# Patient Record
Sex: MALE | Race: BLACK OR AFRICAN AMERICAN | NOT HISPANIC OR LATINO | Employment: OTHER | URBAN - METROPOLITAN AREA
[De-identification: names, ages, dates, MRNs, and addresses within clinical notes are randomized per-mention and may not be internally consistent; named-entity substitution may affect disease eponyms.]

---

## 2017-01-19 ENCOUNTER — HOSPITAL ENCOUNTER (EMERGENCY)
Facility: HOSPITAL | Age: 72
Discharge: HOME | End: 2017-01-20
Attending: EMERGENCY MEDICINE
Payer: MEDICARE

## 2017-01-19 DIAGNOSIS — H10.32 ACUTE BACTERIAL CONJUNCTIVITIS OF LEFT EYE: Primary | ICD-10-CM

## 2017-01-19 DIAGNOSIS — R05.9 COUGH: ICD-10-CM

## 2017-01-19 PROCEDURE — 99284 EMERGENCY DEPT VISIT MOD MDM: CPT

## 2017-01-19 NOTE — ED AVS SNAPSHOT
[facility] [address]  [place] [address]               Catherine Wilkinson   2017 11:57 PM   ED    Description:  Male : 1945   Department:  [facility]           Your Care was Coordinated By:     Provider Role From To    Melody Butler DO Attending Provider 17 0015 --      Reason for Visit     Fever     Eye Problem     Cough           Diagnoses this Visit        Comments    Acute bacterial conjunctivitis of left eye    -  Primary       ED Disposition     None           To Do List           Follow-up Information     Follow up with Eitan Dhaliwal MD In 2 days.    Specialty:  Internal Medicine    Why:  If symptoms worsen return     Contact information:    [address] [facility]  [place] [address]  [phone]         These Medications        Disp Refills Start End    gentamicin (GARAMYCIN) 0.3 % ophthalmic solution 15 mL 0 2017    Place 2 drops into both eyes 4 (four) times daily. - Both Eyes      [facility]     [facility] -  Assistance  Registered nurses in the [facility] provide clinical advisement, health education, appointment booking, and other advisory services.  Call for this free service at [phone].             Medications           Message regarding Medications     Verify the changes and/or additions to your medication regime listed below are the same as discussed with your clinician today.  If any of these changes or additions are incorrect, please notify your healthcare provider.        START taking these NEW medications        Refills    gentamicin (GARAMYCIN) 0.3 % ophthalmic solution 0    Sig: Place 2 drops into both eyes 4 (four) times daily.    Class: Print    Route: Both Eyes           Verify that the below list of medications is an accurate representation of the medications you are currently taking.  If none reported, the list may be blank. If incorrect,  please contact your healthcare provider. Carry this list with you in case of emergency.           Current Medications     AMLODIPINE BES/OLMESARTAN MED (JEANIE ORAL) Take by mouth.    gentamicin (GARAMYCIN) 0.3 % ophthalmic solution Place 2 drops into both eyes 4 (four) times daily.    ramipril (ALTACE) 10 MG capsule Take 10 mg by mouth once daily.           Clinical Reference Information           Your Vitals Were     BP Pulse Temp Resp Weight SpO2    134/61 (BP Location: Right arm, Patient Position: Sitting) 73 98.7 °F (37.1 °C) (Tympanic) 20 49.9 kg (110 lb) 98%    BMI                20.12 kg/m2          Allergies as of 1/20/2017     No Known Allergies      Immunizations Administered on Date of Encounter - 1/20/2017     None      ED Micro, Lab, POCT     Start Ordered       Status Ordering Provider    01/20/17 0022 01/20/17 0022  Influenza antigen  STAT      Final result       ED Imaging Orders     Start Ordered       Status Ordering Provider    01/20/17 0022 01/20/17 0022  X-Ray Chest PA And Lateral  1 time imaging      Acknowledged         Discharge Instructions         Conjunctivitis, Nonspecific    The membrane that covers the white part of your eye (the conjunctiva) is inflamed. Inflammation happens when your body responds to an injury, allergic reaction, infection, or illness. Symptoms of inflammation in the eye may include redness, irritation, itching, swelling, or burning. These symptoms should go away within the next 24 hours. Conjunctivitis may be related to a particle that was in your eye. If so, it may wash out with your tears or irrigation treatment. Being exposed to liquid chemicals or fumes may also cause this reaction.   Home care  · Apply a cold pack (ice in a plastic bag, wrapped in a towel) over the eye for 20 minutes at a time. This will reduce pain.  · Artificial tears may be prescribed to reduce irritation or redness.  These should be used 3 to 4 times a day.  · You may use acetaminophen or  ibuprofen to control pain, unless another medicine was prescribed.(Note: If you have chronic liver or kidney disease, or if you have ever had a stomach ulcer or gastrointestinal bleeding, talk with your healthcare provider before using these medicines.)  Follow-up care  Follow up with your healthcare provider, or as advised.  When to seek medical advice  Call your healthcare provider right away if any of these occur:  · Increased eyelid swelling  · Increased eye pain  · Increased redness or drainage from the eye  · Increased blurry vision or increased sensitivity to light  · Failure of normal vision to return within 24 to 48 hours  © 3246-1108 [employer]. [address] [place] [address]. All rights reserved. This information is not intended as a substitute for professional medical care. Always follow your healthcare professional's instructions.          [facility] Sign-Up     Activating your [facility] account is as easy as 1-2-3!     1) Visit [email or web address], select Sign Up Now, enter this activation code and your date of birth, then select Next.  [email or web address]  Expires: 3/6/2017 12:59 AM      2) Create a username and password to use when you visit [facility] in the future and select a security question in case you lose your password and select Next.    3) Enter your e-mail address and click Sign Up!    Additional Information  If you have questions, please e-mail [email or web address] or call [phone] to talk to our [facility] staff. Remember, [facility] is NOT to be used for urgent needs. For medical emergencies, dial 911.          [facility] complies with applicable Federal civil rights laws and does not discriminate on the basis of race, color, national origin, age, disability, or sex.        Language Assistance Services     ATTENTION: Language assistance services are available, free of charge. Please call [phone].      ATENCIÓN: abhijit Saleh  disposición servicios gratuitos de asistencia lingüística. Llsukhwinder al [phone].     JORGE Ý: N?u b?n nói Ti?ng Vi?t, có các d?ch v? h? tr? ngôn ng? mi?n phí dành cho b?n. G?i s? [phone].

## 2017-01-20 VITALS
WEIGHT: 110 LBS | OXYGEN SATURATION: 100 % | DIASTOLIC BLOOD PRESSURE: 76 MMHG | SYSTOLIC BLOOD PRESSURE: 139 MMHG | HEART RATE: 71 BPM | RESPIRATION RATE: 18 BRPM | BODY MASS INDEX: 20.12 KG/M2 | TEMPERATURE: 98 F

## 2017-01-20 LAB
FLUAV AG SPEC QL IA: NEGATIVE
FLUBV AG SPEC QL IA: NEGATIVE
SPECIMEN SOURCE: NORMAL

## 2017-01-20 PROCEDURE — 87400 INFLUENZA A/B EACH AG IA: CPT

## 2017-01-20 PROCEDURE — 25000003 PHARM REV CODE 250: Performed by: EMERGENCY MEDICINE

## 2017-01-20 RX ORDER — GENTAMICIN SULFATE 0.3 %
0.5 OINTMENT (GRAM) OPHTHALMIC (EYE)
Status: COMPLETED | OUTPATIENT
Start: 2017-01-20 | End: 2017-01-20

## 2017-01-20 RX ORDER — GENTAMICIN SULFATE 3 MG/ML
2 SOLUTION/ DROPS OPHTHALMIC 4 TIMES DAILY
Qty: 15 ML | Refills: 0 | Status: SHIPPED | OUTPATIENT
Start: 2017-01-20 | End: 2017-01-30

## 2017-01-20 RX ADMIN — GENTAMICIN SULFATE 0.5 INCH: 3 OINTMENT OPHTHALMIC at 01:01

## 2017-01-20 NOTE — ED PROVIDER NOTES
"Encounter Date: 1/19/2017       History     Chief Complaint   Patient presents with    Fever     cargiver states "he felt warm so I brought him in"     Eye Problem     reports eye swelling and cough for one week    Cough     reports decreased po intake      Review of patient's allergies indicates:  No Known Allergies  HPI     Patient presented to the emergency department with his sister from home.  Patient has advanced mental retardation.  He lives at home with his sister he is nonverbal.  Patient was brought to the emergency room tonight because the sister felt to his running fever.  He's also had some eye drainage over the last week.  Patient also has had some cough and slight decrease in his fluid intake however he continues to eat normally.  He's had no nausea vomiting or diarrhea.  He's had no urinary change.  Patient is very pleasant and interactive with the examiner.  He is nonverbal.  He is difficult to obtain a history from the patient.  Past Medical History   Diagnosis Date    Hypertension     MR (mental retardation)      No past medical history pertinent negatives.  Past Surgical History   Procedure Laterality Date    Amputation       History reviewed. No pertinent family history.  Social History   Substance Use Topics    Smoking status: Never Smoker    Smokeless tobacco: None    Alcohol use No     Review of Systems   Reason unable to perform ROS: history is  by his sister.   Constitutional: Negative for appetite change, chills and fever.   HENT: Positive for congestion. Negative for ear discharge, rhinorrhea and sore throat.    Eyes: Positive for pain, discharge and redness.   Respiratory: Negative for cough and shortness of breath.    Cardiovascular: Negative for chest pain.   Gastrointestinal: Negative for abdominal pain, nausea and vomiting.   Genitourinary: Negative for decreased urine volume and difficulty urinating.   Neurological: Negative for dizziness, weakness and light-headedness. "       Physical Exam   Initial Vitals   BP Pulse Resp Temp SpO2   01/19/17 2342 01/19/17 2342 01/19/17 2342 01/19/17 2342 01/19/17 2342   134/61 73 20 98.7 °F (37.1 °C) 98 %     Physical Exam    Nursing note and vitals reviewed.  Constitutional: He appears well-developed and well-nourished.   HENT:   Head: Normocephalic and atraumatic.   Right Ear: External ear normal.   Left Ear: External ear normal.   Nose: Nose normal.   Eyes: EOM are normal. Pupils are equal, round, and reactive to light. Left eye exhibits discharge.   Patient has severe conjunctival injection with purulent white drainage.  The right conjunctiva is very injected as well without drainage at this time   Neck: Normal range of motion. Neck supple.   Cardiovascular: Normal rate and regular rhythm.   Pulmonary/Chest: Breath sounds normal.   Abdominal: Soft. There is no rebound and no guarding.   Musculoskeletal: Normal range of motion.   Neurological: He is alert. No cranial nerve deficit.   And has advanced mental retardation   Skin: Skin is warm and dry.   Psychiatric: He has a normal mood and affect.         ED Course   Procedures  Labs Reviewed   INFLUENZA A AND B ANTIGEN             Medical Decision Making:   Differential Diagnosis:   Patient presented to the emergency department with history of having fever tonight eye drainage and cough.  Patient has purulent  conjunctivitis of the left eye.  His lung sounds are clear to auscultation had minimal nasal discharge.  Patient's influenza is negative.  Chest x-ray is within normal limits.  Patient is going to be sent home on Genoptic drops as well as Genoptic ointment.  N discussed with the family that this is contagious and care must be taken in helping care for the patient's infection.  They're encouraged to wear gloves at all times.  Was encouraged to get a thermometer so they can monitor the patient's temperature.  And use Tylenol for fever if appropriate.  Encouraged to follow up with his family  doctor in 3-4 days for recheck.                   ED Course     Clinical Impression:   The primary encounter diagnosis was Acute bacterial conjunctivitis of left eye. A diagnosis of Cough was also pertinent to this visit.          Melody Butler DO  01/20/17 0149

## 2017-01-20 NOTE — ED NOTES
Family member states patient has been having a runny nose and cough.  Also left eye red and swollen.

## 2017-01-20 NOTE — DISCHARGE INSTRUCTIONS
Conjunctivitis, Nonspecific    The membrane that covers the white part of your eye (the conjunctiva) is inflamed. Inflammation happens when your body responds to an injury, allergic reaction, infection, or illness. Symptoms of inflammation in the eye may include redness, irritation, itching, swelling, or burning. These symptoms should go away within the next 24 hours. Conjunctivitis may be related to a particle that was in your eye. If so, it may wash out with your tears or irrigation treatment. Being exposed to liquid chemicals or fumes may also cause this reaction.   Home care  · Apply a cold pack (ice in a plastic bag, wrapped in a towel) over the eye for 20 minutes at a time. This will reduce pain.  · Artificial tears may be prescribed to reduce irritation or redness.  These should be used 3 to 4 times a day.  · You may use acetaminophen or ibuprofen to control pain, unless another medicine was prescribed.(Note: If you have chronic liver or kidney disease, or if you have ever had a stomach ulcer or gastrointestinal bleeding, talk with your healthcare provider before using these medicines.)  Follow-up care  Follow up with your healthcare provider, or as advised.  When to seek medical advice  Call your healthcare provider right away if any of these occur:  · Increased eyelid swelling  · Increased eye pain  · Increased redness or drainage from the eye  · Increased blurry vision or increased sensitivity to light  · Failure of normal vision to return within 24 to 48 hours  © 1137-6609 [employer]. [address] [place] [address]. All rights reserved. This information is not intended as a substitute for professional medical care. Always follow your healthcare professional's instructions.

## 2020-12-09 NOTE — ED NOTES
Awake and alert.  Respirations even and unlabored. Family members with patient.   Prescribed appropriate antibiotic therapy.

## 2021-01-14 ENCOUNTER — OFFICE VISIT (OUTPATIENT)
Dept: URGENT CARE | Facility: CLINIC | Age: 76
End: 2021-01-14
Payer: MEDICARE

## 2021-01-14 VITALS
HEIGHT: 62 IN | TEMPERATURE: 97 F | OXYGEN SATURATION: 100 % | BODY MASS INDEX: 18.4 KG/M2 | WEIGHT: 100 LBS | DIASTOLIC BLOOD PRESSURE: 71 MMHG | HEART RATE: 61 BPM | RESPIRATION RATE: 16 BRPM | SYSTOLIC BLOOD PRESSURE: 139 MMHG

## 2021-01-14 DIAGNOSIS — H66.92 LEFT OTITIS MEDIA, UNSPECIFIED OTITIS MEDIA TYPE: Primary | ICD-10-CM

## 2021-01-14 PROCEDURE — 99214 OFFICE O/P EST MOD 30 MIN: CPT | Mod: S$GLB,,, | Performed by: FAMILY MEDICINE

## 2021-01-14 PROCEDURE — 99214 PR OFFICE/OUTPT VISIT, EST, LEVL IV, 30-39 MIN: ICD-10-PCS | Mod: S$GLB,,, | Performed by: FAMILY MEDICINE

## 2021-01-14 RX ORDER — AMOXICILLIN AND CLAVULANATE POTASSIUM 875; 125 MG/1; MG/1
1 TABLET, FILM COATED ORAL 2 TIMES DAILY
Qty: 20 TABLET | Refills: 0 | Status: SHIPPED | OUTPATIENT
Start: 2021-01-14 | End: 2021-01-24

## 2021-01-14 RX ORDER — LOSARTAN POTASSIUM 25 MG/1
25 TABLET ORAL DAILY
Status: ON HOLD | COMMUNITY
End: 2023-11-03 | Stop reason: HOSPADM

## 2021-01-29 ENCOUNTER — HOSPITAL ENCOUNTER (EMERGENCY)
Facility: HOSPITAL | Age: 76
Discharge: HOME | End: 2021-01-29
Attending: EMERGENCY MEDICINE
Payer: MEDICARE

## 2021-01-29 VITALS
TEMPERATURE: 98 F | RESPIRATION RATE: 21 BRPM | HEART RATE: 81 BPM | SYSTOLIC BLOOD PRESSURE: 154 MMHG | OXYGEN SATURATION: 97 % | DIASTOLIC BLOOD PRESSURE: 73 MMHG

## 2021-01-29 DIAGNOSIS — M79.89 LEG SWELLING: ICD-10-CM

## 2021-01-29 DIAGNOSIS — M25.475 SWELLING OF FOOT JOINT, LEFT: ICD-10-CM

## 2021-01-29 DIAGNOSIS — B35.6 TINEA CRURIS: ICD-10-CM

## 2021-01-29 DIAGNOSIS — J90 RECURRENT PLEURAL EFFUSION ON RIGHT: Primary | ICD-10-CM

## 2021-01-29 LAB
ALBUMIN SERPL BCP-MCNC: 3.1 G/DL (ref 3.5–5.2)
ALP SERPL-CCNC: 138 U/L (ref 55–135)
ALT SERPL W/O P-5'-P-CCNC: 25 U/L (ref 10–44)
ANION GAP SERPL CALC-SCNC: 8 MMOL/L (ref 8–16)
AST SERPL-CCNC: 34 U/L (ref 10–40)
BILIRUB SERPL-MCNC: 0.4 MG/DL (ref 0.1–1)
BILIRUB UR QL STRIP: NEGATIVE
BNP SERPL-MCNC: 98 PG/ML (ref 0–99)
BUN SERPL-MCNC: 13 MG/DL (ref 8–23)
CALCIUM SERPL-MCNC: 8.7 MG/DL (ref 8.7–10.5)
CHLORIDE SERPL-SCNC: 106 MMOL/L (ref 95–110)
CLARITY UR: CLEAR
CO2 SERPL-SCNC: 26 MMOL/L (ref 23–29)
COLOR UR: YELLOW
CREAT SERPL-MCNC: 0.8 MG/DL (ref 0.5–1.4)
ERYTHROCYTE [DISTWIDTH] IN BLOOD BY AUTOMATED COUNT: 13.8 % (ref 11.5–14.5)
EST. GFR  (AFRICAN AMERICAN): >60 ML/MIN/1.73 M^2
EST. GFR  (NON AFRICAN AMERICAN): >60 ML/MIN/1.73 M^2
GLUCOSE SERPL-MCNC: 75 MG/DL (ref 70–110)
GLUCOSE UR QL STRIP: NEGATIVE
HCT VFR BLD AUTO: 37.5 % (ref 40–54)
HGB BLD-MCNC: 11.7 G/DL (ref 14–18)
HGB UR QL STRIP: NEGATIVE
KETONES UR QL STRIP: NEGATIVE
LEUKOCYTE ESTERASE UR QL STRIP: ABNORMAL
MCH RBC QN AUTO: 29.8 PG (ref 27–31)
MCHC RBC AUTO-ENTMCNC: 31.2 G/DL (ref 32–36)
MCV RBC AUTO: 96 FL (ref 82–98)
MICROSCOPIC COMMENT: ABNORMAL
NITRITE UR QL STRIP: NEGATIVE
PH UR STRIP: 8 [PH] (ref 5–8)
PLATELET # BLD AUTO: 143 K/UL (ref 150–350)
PMV BLD AUTO: 10.3 FL (ref 9.2–12.9)
POTASSIUM SERPL-SCNC: 4.9 MMOL/L (ref 3.5–5.1)
PROT SERPL-MCNC: 7.3 G/DL (ref 6–8.4)
PROT UR QL STRIP: NEGATIVE
RBC # BLD AUTO: 3.92 M/UL (ref 4.6–6.2)
SARS-COV-2 RDRP RESP QL NAA+PROBE: NEGATIVE
SODIUM SERPL-SCNC: 140 MMOL/L (ref 136–145)
SP GR UR STRIP: 1.01 (ref 1–1.03)
TROPONIN I SERPL DL<=0.01 NG/ML-MCNC: <0.006 NG/ML (ref 0–0.03)
URN SPEC COLLECT METH UR: ABNORMAL
UROBILINOGEN UR STRIP-ACNC: NEGATIVE EU/DL
WBC # BLD AUTO: 4.63 K/UL (ref 3.9–12.7)
WBC #/AREA URNS HPF: 17 /HPF (ref 0–5)

## 2021-01-29 PROCEDURE — 80053 COMPREHEN METABOLIC PANEL: CPT

## 2021-01-29 PROCEDURE — 93010 ELECTROCARDIOGRAM REPORT: CPT | Mod: ,,, | Performed by: INTERNAL MEDICINE

## 2021-01-29 PROCEDURE — 87086 URINE CULTURE/COLONY COUNT: CPT

## 2021-01-29 PROCEDURE — 83880 ASSAY OF NATRIURETIC PEPTIDE: CPT

## 2021-01-29 PROCEDURE — 84484 ASSAY OF TROPONIN QUANT: CPT

## 2021-01-29 PROCEDURE — U0002 COVID-19 LAB TEST NON-CDC: HCPCS

## 2021-01-29 PROCEDURE — 81000 URINALYSIS NONAUTO W/SCOPE: CPT

## 2021-01-29 PROCEDURE — 93010 EKG 12-LEAD: ICD-10-PCS | Mod: ,,, | Performed by: INTERNAL MEDICINE

## 2021-01-29 PROCEDURE — 93005 ELECTROCARDIOGRAM TRACING: CPT

## 2021-01-29 PROCEDURE — 85027 COMPLETE CBC AUTOMATED: CPT

## 2021-01-29 PROCEDURE — 99285 EMERGENCY DEPT VISIT HI MDM: CPT | Mod: 25

## 2021-01-29 RX ORDER — CEPHALEXIN 500 MG/1
500 CAPSULE ORAL EVERY 12 HOURS
Qty: 14 CAPSULE | Refills: 0 | Status: SHIPPED | OUTPATIENT
Start: 2021-01-29 | End: 2021-02-05

## 2021-01-29 RX ORDER — CLOTRIMAZOLE 1 G/ML
SOLUTION TOPICAL 2 TIMES DAILY
Qty: 15 ML | Refills: 0 | Status: SHIPPED | OUTPATIENT
Start: 2021-01-29 | End: 2021-02-05

## 2021-01-29 RX ORDER — OLMESARTAN MEDOXOMIL 40 MG/1
40 TABLET ORAL DAILY
Status: ON HOLD | COMMUNITY
End: 2023-11-03 | Stop reason: SDUPTHER

## 2021-01-29 RX ORDER — AMLODIPINE BESYLATE 5 MG/1
5 TABLET ORAL DAILY
Status: ON HOLD | COMMUNITY
End: 2023-11-03 | Stop reason: HOSPADM

## 2021-01-31 LAB — BACTERIA UR CULT: NO GROWTH

## 2021-03-12 ENCOUNTER — IMMUNIZATION (OUTPATIENT)
Dept: PRIMARY CARE CLINIC | Facility: CLINIC | Age: 76
End: 2021-03-12
Payer: MEDICAID

## 2021-03-12 DIAGNOSIS — Z23 NEED FOR VACCINATION: Primary | ICD-10-CM

## 2021-03-12 PROCEDURE — 0001A PR IMMUNIZ ADMIN, SARS-COV-2 COVID-19 VACC, 30MCG/0.3ML, 1ST DOSE: ICD-10-PCS | Mod: CV19,S$GLB,, | Performed by: INTERNAL MEDICINE

## 2021-03-12 PROCEDURE — 91300 PR SARS-COV- 2 COVID-19 VACCINE, NO PRSV, 30MCG/0.3ML, IM: ICD-10-PCS | Mod: S$GLB,,, | Performed by: INTERNAL MEDICINE

## 2021-03-12 PROCEDURE — 91300 PR SARS-COV- 2 COVID-19 VACCINE, NO PRSV, 30MCG/0.3ML, IM: CPT | Mod: S$GLB,,, | Performed by: INTERNAL MEDICINE

## 2021-03-12 PROCEDURE — 0001A PR IMMUNIZ ADMIN, SARS-COV-2 COVID-19 VACC, 30MCG/0.3ML, 1ST DOSE: CPT | Mod: CV19,S$GLB,, | Performed by: INTERNAL MEDICINE

## 2021-03-12 RX ADMIN — Medication 0.3 ML: at 01:03

## 2021-04-02 ENCOUNTER — IMMUNIZATION (OUTPATIENT)
Dept: PRIMARY CARE CLINIC | Facility: CLINIC | Age: 76
End: 2021-04-02
Payer: MEDICARE

## 2021-04-02 DIAGNOSIS — Z23 NEED FOR VACCINATION: Primary | ICD-10-CM

## 2021-04-02 PROCEDURE — 0002A PR IMMUNIZ ADMIN, SARS-COV-2 COVID-19 VACC, 30MCG/0.3ML, 2ND DOSE: ICD-10-PCS | Mod: CV19,S$GLB,, | Performed by: INTERNAL MEDICINE

## 2021-04-02 PROCEDURE — 91300 PR SARS-COV- 2 COVID-19 VACCINE, NO PRSV, 30MCG/0.3ML, IM: ICD-10-PCS | Mod: S$GLB,,, | Performed by: INTERNAL MEDICINE

## 2021-04-02 PROCEDURE — 91300 PR SARS-COV- 2 COVID-19 VACCINE, NO PRSV, 30MCG/0.3ML, IM: CPT | Mod: S$GLB,,, | Performed by: INTERNAL MEDICINE

## 2021-04-02 PROCEDURE — 0002A PR IMMUNIZ ADMIN, SARS-COV-2 COVID-19 VACC, 30MCG/0.3ML, 2ND DOSE: CPT | Mod: CV19,S$GLB,, | Performed by: INTERNAL MEDICINE

## 2021-04-02 RX ADMIN — Medication 0.3 ML: at 02:04

## 2022-03-07 ENCOUNTER — PES CALL (OUTPATIENT)
Dept: ADMINISTRATIVE | Facility: CLINIC | Age: 77
End: 2022-03-07
Payer: MEDICARE

## 2022-09-02 ENCOUNTER — PES CALL (OUTPATIENT)
Dept: ADMINISTRATIVE | Facility: OTHER | Age: 77
End: 2022-09-02
Payer: MEDICARE

## 2023-11-01 ENCOUNTER — CLINICAL SUPPORT (OUTPATIENT)
Dept: URGENT CARE | Facility: CLINIC | Age: 78
End: 2023-11-01
Payer: MEDICARE

## 2023-11-01 ENCOUNTER — HOSPITAL ENCOUNTER (OUTPATIENT)
Facility: HOSPITAL | Age: 78
Discharge: HOME | End: 2023-11-03
Attending: EMERGENCY MEDICINE | Admitting: INTERNAL MEDICINE
Payer: MEDICARE

## 2023-11-01 DIAGNOSIS — N39.0 COMPLICATED UTI (URINARY TRACT INFECTION): ICD-10-CM

## 2023-11-01 DIAGNOSIS — Z23 ENCOUNTER FOR ADMINISTRATION OF VACCINE: Primary | ICD-10-CM

## 2023-11-01 DIAGNOSIS — N39.0 UTI (URINARY TRACT INFECTION): Primary | ICD-10-CM

## 2023-11-01 DIAGNOSIS — N13.30 HYDRONEPHROSIS, UNSPECIFIED HYDRONEPHROSIS TYPE: ICD-10-CM

## 2023-11-01 DIAGNOSIS — N17.9 AKI (ACUTE KIDNEY INJURY): ICD-10-CM

## 2023-11-01 LAB
ALBUMIN SERPL BCP-MCNC: 3.6 G/DL (ref 3.5–5.2)
ALP SERPL-CCNC: 101 U/L (ref 55–135)
ALT SERPL W/O P-5'-P-CCNC: 15 U/L (ref 10–44)
ANION GAP SERPL CALC-SCNC: 9 MMOL/L (ref 8–16)
AST SERPL-CCNC: 24 U/L (ref 10–40)
BACTERIA #/AREA URNS HPF: ABNORMAL /HPF
BASOPHILS # BLD AUTO: 0.04 K/UL (ref 0–0.2)
BASOPHILS NFR BLD: 0.5 % (ref 0–1.9)
BILIRUB SERPL-MCNC: 0.7 MG/DL (ref 0.1–1)
BILIRUB UR QL STRIP: NEGATIVE
BUN SERPL-MCNC: 26 MG/DL (ref 8–23)
CALCIUM SERPL-MCNC: 10 MG/DL (ref 8.7–10.5)
CHLORIDE SERPL-SCNC: 118 MMOL/L (ref 95–110)
CLARITY UR: ABNORMAL
CO2 SERPL-SCNC: 20 MMOL/L (ref 23–29)
COLOR UR: YELLOW
CREAT SERPL-MCNC: 1.7 MG/DL (ref 0.5–1.4)
DIFFERENTIAL METHOD: NORMAL
EOSINOPHIL # BLD AUTO: 0.3 K/UL (ref 0–0.5)
EOSINOPHIL NFR BLD: 3.9 % (ref 0–8)
ERYTHROCYTE [DISTWIDTH] IN BLOOD BY AUTOMATED COUNT: 12.7 % (ref 11.5–14.5)
EST. GFR  (NO RACE VARIABLE): 41 ML/MIN/1.73 M^2
GLUCOSE SERPL-MCNC: 104 MG/DL (ref 70–110)
GLUCOSE UR QL STRIP: NEGATIVE
HCT VFR BLD AUTO: 46.4 % (ref 40–54)
HGB BLD-MCNC: 15 G/DL (ref 14–18)
HGB UR QL STRIP: ABNORMAL
HYALINE CASTS #/AREA URNS LPF: 0 /LPF
IMM GRANULOCYTES # BLD AUTO: 0.02 K/UL (ref 0–0.04)
IMM GRANULOCYTES NFR BLD AUTO: 0.3 % (ref 0–0.5)
KETONES UR QL STRIP: NEGATIVE
LEUKOCYTE ESTERASE UR QL STRIP: ABNORMAL
LYMPHOCYTES # BLD AUTO: 1.5 K/UL (ref 1–4.8)
LYMPHOCYTES NFR BLD: 20.8 % (ref 18–48)
MCH RBC QN AUTO: 30.4 PG (ref 27–31)
MCHC RBC AUTO-ENTMCNC: 32.3 G/DL (ref 32–36)
MCV RBC AUTO: 94 FL (ref 82–98)
MICROSCOPIC COMMENT: ABNORMAL
MONOCYTES # BLD AUTO: 0.7 K/UL (ref 0.3–1)
MONOCYTES NFR BLD: 9 % (ref 4–15)
NEUTROPHILS # BLD AUTO: 4.9 K/UL (ref 1.8–7.7)
NEUTROPHILS NFR BLD: 65.8 % (ref 38–73)
NITRITE UR QL STRIP: NEGATIVE
NRBC BLD-RTO: 0 /100 WBC
PH UR STRIP: >8 [PH] (ref 5–8)
PLATELET # BLD AUTO: 195 K/UL (ref 150–450)
PMV BLD AUTO: 9.7 FL (ref 9.2–12.9)
POTASSIUM SERPL-SCNC: 4.7 MMOL/L (ref 3.5–5.1)
PROT SERPL-MCNC: 7.5 G/DL (ref 6–8.4)
PROT UR QL STRIP: ABNORMAL
RBC # BLD AUTO: 4.93 M/UL (ref 4.6–6.2)
RBC #/AREA URNS HPF: 8 /HPF (ref 0–4)
SODIUM SERPL-SCNC: 147 MMOL/L (ref 136–145)
SP GR UR STRIP: 1.02 (ref 1–1.03)
URN SPEC COLLECT METH UR: ABNORMAL
UROBILINOGEN UR STRIP-ACNC: NEGATIVE EU/DL
WBC # BLD AUTO: 7.42 K/UL (ref 3.9–12.7)
WBC #/AREA URNS HPF: 6 /HPF (ref 0–5)

## 2023-11-01 PROCEDURE — 96365 THER/PROPH/DIAG IV INF INIT: CPT

## 2023-11-01 PROCEDURE — 63600175 PHARM REV CODE 636 W HCPCS: Performed by: EMERGENCY MEDICINE

## 2023-11-01 PROCEDURE — 87086 URINE CULTURE/COLONY COUNT: CPT

## 2023-11-01 PROCEDURE — 96361 HYDRATE IV INFUSION ADD-ON: CPT

## 2023-11-01 PROCEDURE — G0378 HOSPITAL OBSERVATION PER HR: HCPCS

## 2023-11-01 PROCEDURE — 93005 ELECTROCARDIOGRAM TRACING: CPT

## 2023-11-01 PROCEDURE — 82570 ASSAY OF URINE CREATININE: CPT

## 2023-11-01 PROCEDURE — P9612 CATHETERIZE FOR URINE SPEC: HCPCS

## 2023-11-01 PROCEDURE — 84300 ASSAY OF URINE SODIUM: CPT

## 2023-11-01 PROCEDURE — 80053 COMPREHEN METABOLIC PANEL: CPT

## 2023-11-01 PROCEDURE — S5010 5% DEXTROSE AND 0.45% SALINE: HCPCS

## 2023-11-01 PROCEDURE — 25000003 PHARM REV CODE 250

## 2023-11-01 PROCEDURE — 99285 EMERGENCY DEPT VISIT HI MDM: CPT | Mod: 25

## 2023-11-01 PROCEDURE — 94760 N-INVAS EAR/PLS OXIMETRY 1: CPT

## 2023-11-01 PROCEDURE — 96372 THER/PROPH/DIAG INJ SC/IM: CPT | Mod: 59 | Performed by: EMERGENCY MEDICINE

## 2023-11-01 PROCEDURE — 90694 VACC AIIV4 NO PRSRV 0.5ML IM: CPT | Mod: S$GLB,,, | Performed by: FAMILY MEDICINE

## 2023-11-01 PROCEDURE — 93010 EKG 12-LEAD: ICD-10-PCS | Mod: ,,, | Performed by: INTERNAL MEDICINE

## 2023-11-01 PROCEDURE — 93010 ELECTROCARDIOGRAM REPORT: CPT | Mod: ,,, | Performed by: INTERNAL MEDICINE

## 2023-11-01 PROCEDURE — 81000 URINALYSIS NONAUTO W/SCOPE: CPT

## 2023-11-01 PROCEDURE — G0008 ADMIN INFLUENZA VIRUS VAC: HCPCS | Mod: S$GLB,,, | Performed by: FAMILY MEDICINE

## 2023-11-01 PROCEDURE — 25000003 PHARM REV CODE 250: Performed by: EMERGENCY MEDICINE

## 2023-11-01 PROCEDURE — G0008 FLU VACCINE - QUADRIVALENT - ADJUVANTED: ICD-10-PCS | Mod: S$GLB,,, | Performed by: FAMILY MEDICINE

## 2023-11-01 PROCEDURE — 85025 COMPLETE CBC W/AUTO DIFF WBC: CPT

## 2023-11-01 PROCEDURE — 90694 FLU VACCINE - QUADRIVALENT - ADJUVANTED: ICD-10-PCS | Mod: S$GLB,,, | Performed by: FAMILY MEDICINE

## 2023-11-01 RX ORDER — SODIUM CHLORIDE AND POTASSIUM CHLORIDE 150; 900 MG/100ML; MG/100ML
INJECTION, SOLUTION INTRAVENOUS CONTINUOUS
Status: DISCONTINUED | OUTPATIENT
Start: 2023-11-01 | End: 2023-11-01

## 2023-11-01 RX ORDER — DEXTROSE MONOHYDRATE AND SODIUM CHLORIDE 5; .45 G/100ML; G/100ML
INJECTION, SOLUTION INTRAVENOUS CONTINUOUS
Status: DISCONTINUED | OUTPATIENT
Start: 2023-11-01 | End: 2023-11-02

## 2023-11-01 RX ORDER — SODIUM CHLORIDE 0.9 % (FLUSH) 0.9 %
10 SYRINGE (ML) INJECTION
Status: DISCONTINUED | OUTPATIENT
Start: 2023-11-01 | End: 2023-11-03 | Stop reason: HOSPADM

## 2023-11-01 RX ORDER — ONDANSETRON 2 MG/ML
4 INJECTION INTRAMUSCULAR; INTRAVENOUS EVERY 8 HOURS PRN
Status: DISCONTINUED | OUTPATIENT
Start: 2023-11-01 | End: 2023-11-03 | Stop reason: HOSPADM

## 2023-11-01 RX ORDER — DOXYLAMINE SUCCINATE 25 MG
TABLET ORAL ONCE
Status: DISCONTINUED | OUTPATIENT
Start: 2023-11-01 | End: 2023-11-01

## 2023-11-01 RX ORDER — DOXYLAMINE SUCCINATE 25 MG
TABLET ORAL 2 TIMES DAILY
Status: DISCONTINUED | OUTPATIENT
Start: 2023-11-02 | End: 2023-11-01

## 2023-11-01 RX ORDER — DOXYLAMINE SUCCINATE 25 MG
TABLET ORAL 2 TIMES DAILY
Status: DISCONTINUED | OUTPATIENT
Start: 2023-11-02 | End: 2023-11-03

## 2023-11-01 RX ORDER — RAMIPRIL 10 MG/1
10 CAPSULE ORAL DAILY
Status: ON HOLD | COMMUNITY
End: 2023-11-03 | Stop reason: SDUPTHER

## 2023-11-01 RX ORDER — DEXTROSE MONOHYDRATE AND SODIUM CHLORIDE 5; .45 G/100ML; G/100ML
INJECTION, SOLUTION INTRAVENOUS CONTINUOUS
Status: DISCONTINUED | OUTPATIENT
Start: 2023-11-01 | End: 2023-11-01

## 2023-11-01 RX ORDER — TALC
6 POWDER (GRAM) TOPICAL NIGHTLY PRN
Status: DISCONTINUED | OUTPATIENT
Start: 2023-11-01 | End: 2023-11-03 | Stop reason: HOSPADM

## 2023-11-01 RX ORDER — ENOXAPARIN SODIUM 100 MG/ML
30 INJECTION SUBCUTANEOUS EVERY 24 HOURS
Status: DISCONTINUED | OUTPATIENT
Start: 2023-11-01 | End: 2023-11-02

## 2023-11-01 RX ADMIN — DEXTROSE AND SODIUM CHLORIDE: 5; 450 INJECTION, SOLUTION INTRAVENOUS at 10:11

## 2023-11-01 RX ADMIN — CEFTRIAXONE SODIUM 1 G: 1 INJECTION, POWDER, FOR SOLUTION INTRAMUSCULAR; INTRAVENOUS at 07:11

## 2023-11-01 RX ADMIN — SODIUM CHLORIDE 500 ML: 9 INJECTION, SOLUTION INTRAVENOUS at 07:11

## 2023-11-01 RX ADMIN — SODIUM CHLORIDE AND POTASSIUM CHLORIDE: .9; .15 SOLUTION INTRAVENOUS at 09:11

## 2023-11-01 RX ADMIN — ENOXAPARIN SODIUM 30 MG: 30 INJECTION SUBCUTANEOUS at 09:11

## 2023-11-01 NOTE — Clinical Note
Diagnosis: UTI (urinary tract infection) [483313]   Future Attending Provider: MELISSA TOVAR [[id number]]   Admitting Provider:: MELISSA TOVAR [[id number]]
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
## 2023-11-01 NOTE — ED NOTES
78 y.o. male brought in by family member for concern of UTI - state they noticed dark, foul smelling, and bloody urine that started yesterday. Upon inspection patient has rash and excoriation to genitals with copious purulent drainage and foul smelling odor. Patient is non-verbal - makes grunts and inaudible noises. Family denies vomiting, denies fever, denies all other medical complaints. Patient awake, alert, and oriented x 4. No apparent distress noted. VS currently stable. Patient assisted onto stretcher and changed into a gown. Patient placed on cardiac monitor, continuous pulse oximetry and automatic blood pressure cuff. Bed placed in low locked position, side rails up x 2, call light is within reach of patient orientation to room and explanation of wait provided to patient, alarms set and turned on for monitor and pulse ox, awaiting MD evaluation and orders, plan of care in progress.

## 2023-11-02 PROBLEM — F79 INTELLECTUAL DISABILITY: Chronic | Status: ACTIVE | Noted: 2023-11-02

## 2023-11-02 PROBLEM — N39.0 COMPLICATED UTI (URINARY TRACT INFECTION): Status: ACTIVE | Noted: 2023-11-02

## 2023-11-02 PROBLEM — N17.9 AKI (ACUTE KIDNEY INJURY): Status: ACTIVE | Noted: 2023-11-02

## 2023-11-02 PROBLEM — B37.49 CANDIDAL BALANOPOSTHITIS: Status: ACTIVE | Noted: 2023-11-02

## 2023-11-02 LAB
ALBUMIN SERPL BCP-MCNC: 3 G/DL (ref 3.5–5.2)
ALP SERPL-CCNC: 85 U/L (ref 55–135)
ALT SERPL W/O P-5'-P-CCNC: 12 U/L (ref 10–44)
ANION GAP SERPL CALC-SCNC: 9 MMOL/L (ref 8–16)
AST SERPL-CCNC: 21 U/L (ref 10–40)
BASOPHILS # BLD AUTO: 0.04 K/UL (ref 0–0.2)
BASOPHILS NFR BLD: 0.7 % (ref 0–1.9)
BILIRUB SERPL-MCNC: 0.5 MG/DL (ref 0.1–1)
BUN SERPL-MCNC: 22 MG/DL (ref 8–23)
CALCIUM SERPL-MCNC: 8.8 MG/DL (ref 8.7–10.5)
CHLORIDE SERPL-SCNC: 118 MMOL/L (ref 95–110)
CO2 SERPL-SCNC: 19 MMOL/L (ref 23–29)
CREAT SERPL-MCNC: 1.3 MG/DL (ref 0.5–1.4)
CREAT UR-MCNC: 105.5 MG/DL (ref 23–375)
DIFFERENTIAL METHOD: ABNORMAL
EOSINOPHIL # BLD AUTO: 0.3 K/UL (ref 0–0.5)
EOSINOPHIL NFR BLD: 5.3 % (ref 0–8)
ERYTHROCYTE [DISTWIDTH] IN BLOOD BY AUTOMATED COUNT: 12.6 % (ref 11.5–14.5)
EST. GFR  (NO RACE VARIABLE): 57 ML/MIN/1.73 M^2
GLUCOSE SERPL-MCNC: 102 MG/DL (ref 70–110)
HCT VFR BLD AUTO: 39.6 % (ref 40–54)
HGB BLD-MCNC: 12.9 G/DL (ref 14–18)
IMM GRANULOCYTES # BLD AUTO: 0.02 K/UL (ref 0–0.04)
IMM GRANULOCYTES NFR BLD AUTO: 0.4 % (ref 0–0.5)
LYMPHOCYTES # BLD AUTO: 1.4 K/UL (ref 1–4.8)
LYMPHOCYTES NFR BLD: 26.1 % (ref 18–48)
MCH RBC QN AUTO: 30.3 PG (ref 27–31)
MCHC RBC AUTO-ENTMCNC: 32.6 G/DL (ref 32–36)
MCV RBC AUTO: 93 FL (ref 82–98)
MONOCYTES # BLD AUTO: 0.6 K/UL (ref 0.3–1)
MONOCYTES NFR BLD: 11 % (ref 4–15)
NEUTROPHILS # BLD AUTO: 3.1 K/UL (ref 1.8–7.7)
NEUTROPHILS NFR BLD: 56.9 % (ref 38–73)
NRBC BLD-RTO: 0 /100 WBC
PLATELET # BLD AUTO: 138 K/UL (ref 150–450)
PMV BLD AUTO: 9.7 FL (ref 9.2–12.9)
POTASSIUM SERPL-SCNC: 3.7 MMOL/L (ref 3.5–5.1)
PROT SERPL-MCNC: 6.2 G/DL (ref 6–8.4)
PROT UR-MCNC: 803 MG/DL (ref 0–15)
PROT/CREAT UR: 7.61 MG/G{CREAT} (ref 0–0.2)
RBC # BLD AUTO: 4.26 M/UL (ref 4.6–6.2)
SODIUM SERPL-SCNC: 146 MMOL/L (ref 136–145)
SODIUM UR-SCNC: 113 MMOL/L (ref 20–250)
WBC # BLD AUTO: 5.47 K/UL (ref 3.9–12.7)

## 2023-11-02 PROCEDURE — 63600175 PHARM REV CODE 636 W HCPCS: Performed by: STUDENT IN AN ORGANIZED HEALTH CARE EDUCATION/TRAINING PROGRAM

## 2023-11-02 PROCEDURE — 99204 OFFICE O/P NEW MOD 45 MIN: CPT | Mod: GC,,, | Performed by: UROLOGY

## 2023-11-02 PROCEDURE — 36415 COLL VENOUS BLD VENIPUNCTURE: CPT

## 2023-11-02 PROCEDURE — 25000003 PHARM REV CODE 250

## 2023-11-02 PROCEDURE — 99900035 HC TECH TIME PER 15 MIN (STAT)

## 2023-11-02 PROCEDURE — 99204 PR OFFICE/OUTPT VISIT, NEW, LEVL IV, 45-59 MIN: ICD-10-PCS | Mod: GC,,, | Performed by: UROLOGY

## 2023-11-02 PROCEDURE — G0378 HOSPITAL OBSERVATION PER HR: HCPCS

## 2023-11-02 PROCEDURE — 80053 COMPREHEN METABOLIC PANEL: CPT

## 2023-11-02 PROCEDURE — 85025 COMPLETE CBC W/AUTO DIFF WBC: CPT

## 2023-11-02 PROCEDURE — 96367 TX/PROPH/DG ADDL SEQ IV INF: CPT

## 2023-11-02 PROCEDURE — 25000003 PHARM REV CODE 250: Performed by: INTERNAL MEDICINE

## 2023-11-02 PROCEDURE — 63600175 PHARM REV CODE 636 W HCPCS: Performed by: INTERNAL MEDICINE

## 2023-11-02 PROCEDURE — 96372 THER/PROPH/DIAG INJ SC/IM: CPT | Performed by: INTERNAL MEDICINE

## 2023-11-02 PROCEDURE — 94760 N-INVAS EAR/PLS OXIMETRY 1: CPT

## 2023-11-02 PROCEDURE — 96361 HYDRATE IV INFUSION ADD-ON: CPT

## 2023-11-02 RX ORDER — ERYTHROMYCIN 5 MG/G
OINTMENT OPHTHALMIC EVERY 6 HOURS
Status: DISCONTINUED | OUTPATIENT
Start: 2023-11-02 | End: 2023-11-03 | Stop reason: HOSPADM

## 2023-11-02 RX ORDER — ERYTHROMYCIN 5 MG/G
OINTMENT OPHTHALMIC EVERY 6 HOURS
Status: DISCONTINUED | OUTPATIENT
Start: 2023-11-02 | End: 2023-11-02

## 2023-11-02 RX ORDER — SODIUM CHLORIDE, SODIUM LACTATE, POTASSIUM CHLORIDE, CALCIUM CHLORIDE 600; 310; 30; 20 MG/100ML; MG/100ML; MG/100ML; MG/100ML
INJECTION, SOLUTION INTRAVENOUS CONTINUOUS
Status: DISCONTINUED | OUTPATIENT
Start: 2023-11-02 | End: 2023-11-02

## 2023-11-02 RX ORDER — SODIUM CHLORIDE, SODIUM LACTATE, POTASSIUM CHLORIDE, CALCIUM CHLORIDE 600; 310; 30; 20 MG/100ML; MG/100ML; MG/100ML; MG/100ML
INJECTION, SOLUTION INTRAVENOUS CONTINUOUS
Status: ACTIVE | OUTPATIENT
Start: 2023-11-02 | End: 2023-11-03

## 2023-11-02 RX ORDER — ENOXAPARIN SODIUM 100 MG/ML
40 INJECTION SUBCUTANEOUS EVERY 24 HOURS
Status: DISCONTINUED | OUTPATIENT
Start: 2023-11-02 | End: 2023-11-03 | Stop reason: HOSPADM

## 2023-11-02 RX ADMIN — ERYTHROMYCIN: 5 OINTMENT OPHTHALMIC at 12:11

## 2023-11-02 RX ADMIN — MICONAZOLE NITRATE: 20 CREAM TOPICAL at 08:11

## 2023-11-02 RX ADMIN — SODIUM CHLORIDE, POTASSIUM CHLORIDE, SODIUM LACTATE AND CALCIUM CHLORIDE: 600; 310; 30; 20 INJECTION, SOLUTION INTRAVENOUS at 11:11

## 2023-11-02 RX ADMIN — CEFEPIME 2 G: 2 INJECTION, POWDER, FOR SOLUTION INTRAVENOUS at 02:11

## 2023-11-02 RX ADMIN — ERYTHROMYCIN: 5 OINTMENT OPHTHALMIC at 06:11

## 2023-11-02 RX ADMIN — MICONAZOLE NITRATE: 20 CREAM TOPICAL at 09:11

## 2023-11-02 RX ADMIN — ERYTHROMYCIN: 5 OINTMENT OPHTHALMIC at 11:11

## 2023-11-02 RX ADMIN — ERYTHROMYCIN: 5 OINTMENT OPHTHALMIC at 05:11

## 2023-11-02 RX ADMIN — MICONAZOLE NITRATE: 20 CREAM TOPICAL at 12:11

## 2023-11-02 RX ADMIN — SODIUM CHLORIDE, POTASSIUM CHLORIDE, SODIUM LACTATE AND CALCIUM CHLORIDE: 600; 310; 30; 20 INJECTION, SOLUTION INTRAVENOUS at 02:11

## 2023-11-02 RX ADMIN — ENOXAPARIN SODIUM 40 MG: 40 INJECTION SUBCUTANEOUS at 04:11
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
## 2023-11-02 NOTE — PLAN OF CARE
11/01/23 2241   Admission   Initial VN Admission Questions Complete   Communication Issues? None   Shift   Virtual Nurse - Rounding Complete   Pain Management Interventions quiet environment facilitated;relaxation techniques promoted   Virtual Nurse - Patient Verbalized Approval Of VN Rounding;Camera Use   Type of Frequent Check   Type Patient Rounds   Safety/Activity   Patient Rounds bed in low position;bed wheels locked;call light in patient/parent reach;clutter free environment maintained;ID band on;visualized patient;placement of personal items at bedside   Safety Promotion/Fall Prevention assistive device/personal item within reach;bed alarm set;side rails raised x 2;nonskid shoes/socks when out of bed;medications reviewed;room near unit station;instructed to call staff for mobility;Fall Risk reviewed with patient/family;family to remain at bedside   Safety Precautions emergency equipment at bedside   Safety Bands on Patient Fall Risk Band   Activity Management Rolling - L1   Activity Assistance Provided assistance, 2 people   Positioning   Body Position weight shifting   Head of Bed (HOB) Positioning HOB elevated   Positioning/Transfer Devices pillows;in use      VN cued into room to complete admit assessment. VIP model introduced; VN working alongside bedside treatment team.  Plan of care reviewed with patient. Patient and family informed of fall risk, fall precautions, call light within reach, side rails x2 elevated. Patient is nonverbal , family notified to ask staff for assistance. Patient verbalized complete understanding. Time allowed for questions. Will continue to monitor and intervene as needed.

## 2023-11-02 NOTE — ASSESSMENT & PLAN NOTE
79 yo non-verbal M admitted with UTI, CASIE, phimosis.     - No plans for acute inpatient Urologic intervention.   - F/u urine culture.   - Trend Cr. Trending down - 1.3 from 1.7 on admission.  - Recommend 10-14 days of culture appropriate antibiotics for complicated UTI.   - Recommend antifungal cream + steroid ointment be applied to the foreskin BID for 1-2 weeks. Antifungal and steroid can be combined (ie. Nystatin + triamcinolone ointment).  - Recommend twice daily bathing of glans and foreskin with normal saline solution. Do NOT force the foreskin to retract - only retract the foreskin once able to, gently for cleaning. Always replace the foreskin in the normal anatomic position over the glans.  - Agree with ultrasound, will follow up results.   - Rest of care per primary.

## 2023-11-02 NOTE — PLAN OF CARE
Alert, nonverbal. Will occasionally grunt. Follows commands. Yellow drainage coming from right eye, eye ointment administered. IVF infusing. Medications given per MAR. Vital signs stable throughout the night. Voiding via diaper, having urinary incontinence. Skin on penis is white and patchy. Unable to pull foreskin back. Weight shifting assistance provided. Right AKA present. Safety precautions maintained. Bed alarm set. Call bell within reach. Family at bedside. Patient encouraged to call for assistance.      Problem: Adult Inpatient Plan of Care  Goal: Patient-Specific Goal (Individualized)  Outcome: Ongoing, Progressing  Goal: Absence of Hospital-Acquired Illness or Injury  Outcome: Ongoing, Progressing  Goal: Optimal Comfort and Wellbeing  Outcome: Ongoing, Progressing     Problem: Hypertension Acute  Goal: Blood Pressure Within Desired Range  Outcome: Ongoing, Progressing
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
## 2023-11-02 NOTE — PLAN OF CARE
RIGO met with pt and pt's sister Florinda [phone] at bedside to complete DCA this AM. Pt's sister reported that she will transport the pt home at time of dc. Pt has a wc and bsc at home that he will use. SW will request f/u appts. Pt and family did not have any additional questions or concerns for sw at this time. White board updated with CM name and contact information.  Discharge brochure provided.  Pt encouraged to call with any questions or concerns.  Cm will continue to follow pt through transitions of care and assist with any discharge needs.    NEIL Villareal  [phone]     11/02/23 1308   Discharge Planning   Assessment Type Discharge Planning Brief Assessment   Resource/Environmental Concerns none   Support Systems Family members   Equipment Currently Used at Home wheelchair;bedside commode   Current Living Arrangements home   Care Facility Name Home   Patient/Family Anticipates Transition to home with family   Patient/Family Anticipated Services at Transition none   DME Needed Upon Discharge  none   Discharge Plan A Home

## 2023-11-02 NOTE — PLAN OF CARE
Problem: Adult Inpatient Plan of Care  Goal: Plan of Care Review  Outcome: Ongoing, Progressing  Goal: Patient-Specific Goal (Individualized)  Outcome: Ongoing, Progressing  Goal: Absence of Hospital-Acquired Illness or Injury  Outcome: Ongoing, Progressing  Goal: Optimal Comfort and Wellbeing  Outcome: Ongoing, Progressing  Goal: Readiness for Transition of Care  Outcome: Ongoing, Progressing     Problem: UTI (Urinary Tract Infection)  Goal: Improved Infection Symptoms  Outcome: Ongoing, Progressing     Problem: Hypertension Acute  Goal: Blood Pressure Within Desired Range  Outcome: Ongoing, Progressing     Problem: Fall Injury Risk  Goal: Absence of Fall and Fall-Related Injury  Outcome: Ongoing, Progressing     Problem: Fluid and Electrolyte Imbalance (Acute Kidney Injury/Impairment)  Goal: Fluid and Electrolyte Balance  Outcome: Ongoing, Progressing     Problem: Oral Intake Inadequate (Acute Kidney Injury/Impairment)  Goal: Optimal Nutrition Intake  Outcome: Ongoing, Progressing     Problem: Renal Function Impairment (Acute Kidney Injury/Impairment)  Goal: Effective Renal Function  Outcome: Ongoing, Progressing     Problem: Skin Injury Risk Increased  Goal: Skin Health and Integrity  Outcome: Ongoing, Progressing       Assumed care of patient, patient in no apparent distress, on room air. Patient remained calm throughout shift. Family remained at bedside. Safety precautions maintained.
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
## 2023-11-02 NOTE — PROGRESS NOTES
Pharmacist Renal Dose Adjustment Note    Catherine Wilkinson is a 78 y.o. male being treated with the medication enoxaparin    Patient Data:    Vital Signs (Most Recent):  Temp: 97.2 °F (36.2 °C) (11/02/23 1231)  Pulse: 75 (11/02/23 1231)  Resp: 18 (11/02/23 1231)  BP: 125/60 (11/02/23 1231)  SpO2: 98 % (11/02/23 1231) Vital Signs (72h Range):  Temp:  [97.2 °F (36.2 °C)-98.6 °F (37 °C)]   Pulse:  []   Resp:  [12-22]   BP: (111-153)/(57-89)   SpO2:  [96 %-100 %]      Recent Labs   Lab 11/01/23  1824 11/02/23  0456   CREATININE 1.7* 1.3     Serum creatinine: 1.3 mg/dL 11/02/23 0456  Estimated creatinine clearance: 32.2 mL/min    Medication: enoxaparin 30 mg daily will be changed to enoxaparin 40 mg daily    Pharmacist's Name: Addis Bourne  Pharmacist's Extension: [phone]

## 2023-11-02 NOTE — SUBJECTIVE & OBJECTIVE
Past Medical History:   Diagnosis Date    Hypertension     MR (mental retardation)        Past Surgical History:   Procedure Laterality Date    AMPUTATION         Review of patient's allergies indicates:  No Known Allergies    Family History    None         Tobacco Use    Smoking status: Never    Smokeless tobacco: Not on file   Substance and Sexual Activity    Alcohol use: No    Drug use: Not on file    Sexual activity: Not on file       Review of Systems   Unable to perform ROS      Objective:     Temp:  [97.5 °F (36.4 °C)-98.6 °F (37 °C)] 97.5 °F (36.4 °C)  Pulse:  [] 67  Resp:  [12-22] 18  SpO2:  [96 %-100 %] 96 %  BP: (111-153)/(57-89) 111/58  Weight: 47.8 kg (105 lb 6.1 oz)  Body mass index is 17.01 kg/m².      Bladder Scan Volume (mL): 196 mL (11/02/23 0516)    Drains       None                    Physical Exam  Vitals reviewed.   Constitutional:       General: He is not in acute distress.     Appearance: He is not diaphoretic.   HENT:      Head: Normocephalic and atraumatic.   Cardiovascular:      Rate and Rhythm: Normal rate.   Pulmonary:      Effort: Pulmonary effort is normal. No respiratory distress.   Abdominal:      General: There is no distension.      Palpations: Abdomen is soft.      Tenderness: There is no abdominal tenderness. There is no right CVA tenderness, left CVA tenderness, guarding or rebound.   Genitourinary:     Comments: Uncircumcised male with phimosis. The foreskin has excoriation at the distal end with circumferential grey/white plaque. Scrotum showed no rashes or lesions. Testicles showed no masses or tenderness.  Epididymis showed no masses or tenderness. No penile discharge.    Musculoskeletal:      Cervical back: Normal range of motion.      Comments: R AKA   Skin:     General: Skin is dry.   Neurological:      Mental Status: He is alert.   Psychiatric:      Comments: Non-verbal          Significant Labs:    BMP:  Recent Labs   Lab 11/01/23  1824 11/02/23  0456   *  146*   K 4.7 3.7   * 118*   CO2 20* 19*   BUN 26* 22   CREATININE 1.7* 1.3   CALCIUM 10.0 8.8       CBC:  Recent Labs   Lab 11/01/23  1824 11/02/23  0456   WBC 7.42 5.47   HGB 15.0 12.9*   HCT 46.4 39.6*    138*       All pertinent labs results from the past 24 hours have been reviewed.    Significant Imaging:  All pertinent imaging results/findings from the past 24 hours have been reviewed.
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
## 2023-11-03 ENCOUNTER — HOSPITAL ENCOUNTER (OUTPATIENT)
Dept: RADIOLOGY | Facility: HOSPITAL | Age: 78
Discharge: HOME | End: 2023-11-03
Attending: EMERGENCY MEDICINE | Admitting: INTERNAL MEDICINE
Payer: MEDICARE

## 2023-11-03 VITALS
OXYGEN SATURATION: 98 % | HEIGHT: 66 IN | RESPIRATION RATE: 18 BRPM | WEIGHT: 90.63 LBS | SYSTOLIC BLOOD PRESSURE: 130 MMHG | BODY MASS INDEX: 14.57 KG/M2 | HEART RATE: 77 BPM | TEMPERATURE: 99 F | DIASTOLIC BLOOD PRESSURE: 62 MMHG

## 2023-11-03 DIAGNOSIS — N13.30 HYDRONEPHROSIS, UNSPECIFIED HYDRONEPHROSIS TYPE: ICD-10-CM

## 2023-11-03 LAB
ALBUMIN SERPL BCP-MCNC: 2.6 G/DL (ref 3.5–5.2)
ALP SERPL-CCNC: 75 U/L (ref 55–135)
ALT SERPL W/O P-5'-P-CCNC: 13 U/L (ref 10–44)
ANION GAP SERPL CALC-SCNC: 7 MMOL/L (ref 8–16)
AST SERPL-CCNC: 19 U/L (ref 10–40)
BACTERIA UR CULT: NO GROWTH
BILIRUB SERPL-MCNC: 0.7 MG/DL (ref 0.1–1)
BUN SERPL-MCNC: 17 MG/DL (ref 8–23)
CALCIUM SERPL-MCNC: 8.6 MG/DL (ref 8.7–10.5)
CHLORIDE SERPL-SCNC: 116 MMOL/L (ref 95–110)
CO2 SERPL-SCNC: 19 MMOL/L (ref 23–29)
CREAT SERPL-MCNC: 1 MG/DL (ref 0.5–1.4)
ERYTHROCYTE [DISTWIDTH] IN BLOOD BY AUTOMATED COUNT: 12.6 % (ref 11.5–14.5)
EST. GFR  (NO RACE VARIABLE): >60 ML/MIN/1.73 M^2
GLUCOSE SERPL-MCNC: 85 MG/DL (ref 70–110)
HCT VFR BLD AUTO: 36.7 % (ref 40–54)
HGB BLD-MCNC: 11.9 G/DL (ref 14–18)
MAGNESIUM SERPL-MCNC: 2.1 MG/DL (ref 1.6–2.6)
MCH RBC QN AUTO: 30.4 PG (ref 27–31)
MCHC RBC AUTO-ENTMCNC: 32.4 G/DL (ref 32–36)
MCV RBC AUTO: 94 FL (ref 82–98)
PHOSPHATE SERPL-MCNC: 2.2 MG/DL (ref 2.7–4.5)
PLATELET # BLD AUTO: 112 K/UL (ref 150–450)
PMV BLD AUTO: 9.8 FL (ref 9.2–12.9)
POTASSIUM SERPL-SCNC: 4.1 MMOL/L (ref 3.5–5.1)
PROT SERPL-MCNC: 5.6 G/DL (ref 6–8.4)
RBC # BLD AUTO: 3.92 M/UL (ref 4.6–6.2)
SODIUM SERPL-SCNC: 142 MMOL/L (ref 136–145)
WBC # BLD AUTO: 4.75 K/UL (ref 3.9–12.7)

## 2023-11-03 PROCEDURE — 96361 HYDRATE IV INFUSION ADD-ON: CPT

## 2023-11-03 PROCEDURE — 63600175 PHARM REV CODE 636 W HCPCS: Performed by: INTERNAL MEDICINE

## 2023-11-03 PROCEDURE — G0378 HOSPITAL OBSERVATION PER HR: HCPCS

## 2023-11-03 PROCEDURE — 96366 THER/PROPH/DIAG IV INF ADDON: CPT

## 2023-11-03 PROCEDURE — 99900035 HC TECH TIME PER 15 MIN (STAT)

## 2023-11-03 PROCEDURE — 94761 N-INVAS EAR/PLS OXIMETRY MLT: CPT

## 2023-11-03 PROCEDURE — 99214 PR OFFICE/OUTPT VISIT, EST, LEVL IV, 30-39 MIN: ICD-10-PCS | Mod: ,,, | Performed by: UROLOGY

## 2023-11-03 PROCEDURE — 25000003 PHARM REV CODE 250

## 2023-11-03 PROCEDURE — 99214 OFFICE O/P EST MOD 30 MIN: CPT | Mod: ,,, | Performed by: UROLOGY

## 2023-11-03 PROCEDURE — 25000003 PHARM REV CODE 250: Performed by: INTERNAL MEDICINE

## 2023-11-03 PROCEDURE — 84100 ASSAY OF PHOSPHORUS: CPT | Performed by: HEALTH CARE PROVIDER

## 2023-11-03 PROCEDURE — 85027 COMPLETE CBC AUTOMATED: CPT | Performed by: HEALTH CARE PROVIDER

## 2023-11-03 PROCEDURE — 36415 COLL VENOUS BLD VENIPUNCTURE: CPT | Performed by: HEALTH CARE PROVIDER

## 2023-11-03 PROCEDURE — 83735 ASSAY OF MAGNESIUM: CPT | Performed by: HEALTH CARE PROVIDER

## 2023-11-03 PROCEDURE — 80053 COMPREHEN METABOLIC PANEL: CPT | Performed by: HEALTH CARE PROVIDER

## 2023-11-03 RX ORDER — ERYTHROMYCIN 5 MG/G
OINTMENT OPHTHALMIC EVERY 6 HOURS
Qty: 3.5 G | Refills: 0 | Status: SHIPPED | OUTPATIENT
Start: 2023-11-03 | End: 2023-11-10

## 2023-11-03 RX ORDER — NYSTATIN AND TRIAMCINOLONE ACETONIDE 100000; 1 [USP'U]/G; MG/G
OINTMENT TOPICAL 2 TIMES DAILY
Status: DISCONTINUED | OUTPATIENT
Start: 2023-11-03 | End: 2023-11-03 | Stop reason: HOSPADM

## 2023-11-03 RX ORDER — RAMIPRIL 10 MG/1
10 CAPSULE ORAL DAILY
Qty: 90 CAPSULE | Refills: 1 | Status: SHIPPED | OUTPATIENT
Start: 2023-11-03 | End: 2024-05-01

## 2023-11-03 RX ORDER — OLMESARTAN MEDOXOMIL 40 MG/1
40 TABLET ORAL DAILY
Qty: 90 TABLET | Refills: 1 | Status: SHIPPED | OUTPATIENT
Start: 2023-11-03 | End: 2024-05-01

## 2023-11-03 RX ORDER — SULFAMETHOXAZOLE AND TRIMETHOPRIM 400; 80 MG/1; MG/1
1 TABLET ORAL 2 TIMES DAILY
Qty: 20 TABLET | Refills: 0 | Status: SHIPPED | OUTPATIENT
Start: 2023-11-03 | End: 2023-11-13

## 2023-11-03 RX ORDER — NYSTATIN 100000 U/G
OINTMENT TOPICAL 2 TIMES DAILY
Qty: 30 G | Refills: 0 | Status: SHIPPED | OUTPATIENT
Start: 2023-11-03 | End: 2023-11-18

## 2023-11-03 RX ORDER — TRIAMCINOLONE ACETONIDE 1 MG/G
CREAM TOPICAL 2 TIMES DAILY
Qty: 30 G | Refills: 0 | Status: SHIPPED | OUTPATIENT
Start: 2023-11-03 | End: 2023-11-18

## 2023-11-03 RX ADMIN — NYSTATIN AND TRIAMCINOLONE ACETONIDE: 100000; 1 OINTMENT TOPICAL at 08:11

## 2023-11-03 RX ADMIN — CEFEPIME 2 G: 2 INJECTION, POWDER, FOR SOLUTION INTRAVENOUS at 01:11

## 2023-11-03 RX ADMIN — ERYTHROMYCIN: 5 OINTMENT OPHTHALMIC at 05:11

## 2023-11-03 NOTE — PROGRESS NOTES
[facility] Hospital Medicine Progress Note    Primary Team: [facility] Hospitalist Team A  Attending Physician: Flora Bernal MD  Resident: Lance  Intern: Grace    Subjective:      Pt in bed, sister at bedside. NAEON. Pt at baseline, alert/awake, grunting in response, making eye contact.      Objective:     Last 24 Hour Vital Signs:  BP  Min: 114/55  Max: 144/62  Temp  Av.9 °F (36.6 °C)  Min: 97.2 °F (36.2 °C)  Max: 98.5 °F (36.9 °C)  Pulse  Av.3  Min: 67  Max: 89  Resp  Av  Min: 16  Max: 18  SpO2  Av.6 %  Min: 96 %  Max: 99 %  Weight  Av.1 kg (90 lb 9.7 oz)  Min: 41.1 kg (90 lb 9.7 oz)  Max: 41.1 kg (90 lb 9.7 oz)  I/O last 3 completed shifts:  In: 960 [P.O.:360; IV Piggyback:600]  Out: 225 [Urine:225]    Physical Examination:  Pt lying in bed, no acute distress  Breathing comfortably on room air  Making eye contact, responding to conversation, communicating in grunts      Laboratory:  Laboratory Data Reviewed: yes  Pertinent Findings:  Cr downtrending 1.7 -> 1.0  Random urine: 8g/dl protein, 105.5 mg/dl creatinine, Pr:Cr 7.61, Kunal 113  Albumin downtrending 3.6 -> 3.0 -> 2.6      Microbiology Data Reviewed: yes  Pertinent Findings:   UCx in process    Other Results:  EK/1 EKG - NSR, Qtc 417,     Radiology Data Reviewed: yes  Pertinent Findings:   XR abdomen  Obvious densities to suggest nephrolithiasis or ureteral calculi not appreciated however sensitivity is somewhat diminished due to the aforementioned bowel gas pattern, and positioning.        renal US  Impression:  Severe right-sided hydronephrosis with marked cortical thinning suggesting chronic longstanding hydronephrosis.  Heterogeneous marked bladder wall thickening with echogenic material in the bladder.  While this may relate to debris, bladder neoplasm would be difficult to exclude.     CT renal stone study   Impression:  1. Severe hydronephrosis and hydroureter on the right with severe right renal cortical  thinning.  Urology follow-up recommended.  2. There is thickening of the urinary bladder diffusely which may involve the UVJ on the right with associated obstruction of the right renal collecting system.  Urology follow-up recommended. The UVJ is adjacent to a small urinary bladder diverticulum on the right also. There is a small urinary bladder diverticulum at the lateral left margin of the urinary bladder also.  Few small bladder stones are noted at the dependent portion. Slight scalloping of the bladder wall.  I favor a chronic process. Chronic cystitis or chronic bladder outlet obstruction are considerations.  Neoplastic process is not completely excluded and urology follow-up recommended  3. There is an ovoid 6 mm calcification centrally in the prostate on axial 152 of series 2, image 92 of series 602 and image 84 series 200. This could be prostatic in origin. A prostatic urethral stone would not be excluded.  Recommend urology follow-up.  4. Small left basilar pleural effusion.  5. Probable chronic moderate compression fracture of L4.    Current Medications:     Infusions:       Scheduled:   ceFEPime (MAXIPIME) IVPB  2 g Intravenous Q12H    enoxparin  40 mg Subcutaneous Daily    erythromycin   Right Eye Q6H    miconazole   Topical (Top) BID        PRN:  melatonin, ondansetron, sodium chloride 0.9%    Antibiotics and Day Number of Therapy:  Cefepime 2g q12hr 11/2 -   Ceftriaxone 11/1      Lines and Day Number of Therapy:  LUE PIV 11/1 -     Assessment:     Catherine Wilkinson is a 78 y.o.male with  Patient Active Problem List    Diagnosis Date Noted    CASIE (acute kidney injury) 11/02/2023    Complicated UTI (urinary tract infection) 11/02/2023    Intellectual disability 11/02/2023    Candidal balanoposthitis 11/02/2023        Plan:     Complicated UTI  BIB family for concern of dark and malodorous urine  UA on admit: alkaline to 8.0, many bacteria, 6 WBCs, 8 RBCs, 3+ protein  11/1 UCx in process  Likely 2/2 chronic  obstructive process, see below   Plan  Follow up UCx  Continue cefepime 2g IV q12hr  Plan to tailor abx regimen pending UCx results     Severe R side hydronephrosis and hydroureter  Thickened bladder wall as suspected cause of obstruction  11/2 XR abdomen with limited view, no overt nephrolithiasis  11/2 renal US   R kidney severe hydronephrosis and marked cortical thinning consistent with longstanding hydronephrosis  L kidney no hydronephrosis  Bladder with heterogeneous wall thickening with echogenic material within, bladder neoplasm cannot be excluded   11/2 CT renal stone study   R side severe hydronephrosis and hydroureter, severe cortical thinning; no stones or mass identified  Diffuse thickening of urinary bladder +/- ureterovesical junction on R, associated obstruction of R renal collecting system  R and L side small urinary bladder diverticuli at UVJ  Bladder stones within bladder, scalloping of bladder wall - radiology read favors chronic process such as cystitis but neoplasia not excluded  Prostate bethany in size, ovoid 6mm calcification, prostatic urethral stone not excluded  Plan  NPO midnight last night pending cystoscopy today     CASIE, resolving  Non-AG metabolic acidosis  - Cr trends 1.7 -> 1.3 -> 1.0   - AG 7     Microscopic hematuria  - family had reported concerns of blood-tinged urine in diaper  - UA on presentation 8 RBCs  - 11/1 urine random (not 24h collection) - urine protein 8 grams/dL, creatinine 105.5 mg/dL, prot/creat ratio 7.6, urine Na 113  Plan  - will order 24hr urine collection    R eye conjunctivitis  - pt reports erythema and purulent discharge x 2 day PTA  Plan  - continue topical erythromycin 5mg/g QID    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  Phimosis  - on presentation, family with concerns of rash and excoriations over genitals  - physical exam with purulent discharge, white/gray plaques over foreskin, and phimosis  - seen by urology - recommend  nystatin+triamcinolone BID for 7-14 days, now ordered  - have discontinued miconazole   Plan  - nystatin + triamcinolone applied BID for 7-14 days per urology recs  - twice daily bathing of glans and foreskin with normal saline solution (have ordered via nursing communication)     Primary HTN   - home meds amlodipine 5mg daily, ramipril 10mg daily, ?losartan 25mg daily  - pt normotensive on presentation 119/89,   - BP range last 24hrs 114-144 / 55-97, HR 67-89  Plan  - holding home meds in setting of CASIE   - will CTM BP       PPx: Subq Lovenox renal dose (40mg daily as of 11/2)  Diet: cardiac   Code: Full     Disposition: pending further urology workup, UCx for final abx recs      Gill Edwards MD  [facility] HO-1    [facility] Hospitalist Pager numbers:   [facility] Hospitalist Medicine Team A (Dolores/Phillip): [phone]  [facility] Hospitalist Medicine Team B (Leonardo/Melony):  [phone]
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
## 2023-11-03 NOTE — ASSESSMENT & PLAN NOTE
77 yo non-verbal M admitted with UTI, CASIE, phimosis.     - Ok for diet from Urology perspective.  - CT obtained 11/2/23 shows moderate to severe right sided hydroureteronephrosis down to the level of a significantly thickened bladder. No ureteral stones. Right kidney with thin parenchyma. Appears as if his right kidney has been chronically obstructed or with right sided reflux. Left kidney ok.   - Plan for outpatient MAG-3 Renal study to assess function of right kidney.  - F/u urine culture.   -Cr trending down - 1.0 from 1.3 from 1.7 on admission.  - Recommend 10-14 days of culture appropriate antibiotics for complicated UTI.   - Recommend antifungal cream + steroid ointment be applied to the foreskin BID for 1-2 weeks. Antifungal and steroid can be combined (ie. Nystatin + triamcinolone ointment).  - Recommend twice daily bathing of glans and foreskin with normal saline solution. Do NOT force the foreskin to retract - only retract the foreskin once able to, gently for cleaning. Always replace the foreskin in the normal anatomic position over the glans.  - Rest of care per primary.    - No plans for acute inpatient Urologic intervention.   - Follow up outpatient with Urology in 1 month with a MAG-3 Renal scan prior.  - Urology will sign off, please call with any questions or new developments.

## 2023-11-03 NOTE — SUBJECTIVE & OBJECTIVE
Interval History: NAEON. AFVSS. Pain controlled. Voiding without difficulty.       Objective:     Temp:  [97.2 °F (36.2 °C)-98.5 °F (36.9 °C)] 98 °F (36.7 °C)  Pulse:  [67-89] 67  Resp:  [16-18] 16  SpO2:  [96 %-99 %] 97 %  BP: (114-144)/(55-97) 144/62     Body mass index is 14.62 kg/m².      Bladder Scan Volume (mL): 196 mL (11/02/23 0516)    Drains       None                    Physical Exam  Vitals reviewed.   Constitutional:       General: He is not in acute distress.     Appearance: He is not diaphoretic.   HENT:      Head: Normocephalic and atraumatic.   Cardiovascular:      Rate and Rhythm: Normal rate.   Pulmonary:      Effort: Pulmonary effort is normal. No respiratory distress.   Abdominal:      General: There is no distension.   Genitourinary:     Comments: Uncircumcised male with phimosis. The foreskin has mild excoriation at the distal end with circumferential grey/white plaque. Scrotum showed no rashes or lesions. Testicles showed no masses or tenderness.  Epididymis showed no masses or tenderness. No penile discharge.    Musculoskeletal:      Cervical back: Normal range of motion.      Comments: R AKA   Skin:     General: Skin is dry.   Neurological:      Mental Status: He is alert.   Psychiatric:      Comments: Non verbal           Significant Labs:    BMP:  Recent Labs   Lab 11/01/23 1824 11/02/23 0456 11/03/23 0518   * 146* 142   K 4.7 3.7 4.1   * 118* 116*   CO2 20* 19* 19*   BUN 26* 22 17   CREATININE 1.7* 1.3 1.0   CALCIUM 10.0 8.8 8.6*       CBC:   Recent Labs   Lab 11/01/23 1824 11/02/23 0456 11/03/23  0518   WBC 7.42 5.47 4.75   HGB 15.0 12.9* 11.9*   HCT 46.4 39.6* 36.7*    138* 112*       All pertinent labs results from the past 24 hours have been reviewed.    Significant Imaging:  All pertinent imaging results/findings from the past 24 hours have been reviewed.
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
## 2023-11-03 NOTE — PLAN OF CARE
VN reviewed discharge instructions with pt's sister . Using teach back method.  AVS printed and handed to pt by bedside nurse.  Reviewed follow-up appointments, medications, diet, and importance of medication compliance.  Reviewed home care instructions, treatment plan, self-management, and when to seek medical attention.  Allowed time for questions.  All questions answered.  Patient's sister  verbalized complete understanding of discharge instructions and voices no concerns.     Discharge instructions complete.  Bedside delivery done.  Transport/wheelchair requested.  Bedside nurse notified.

## 2023-11-03 NOTE — DISCHARGE INSTRUCTIONS
twice daily bathing of glans and foreskin with normal saline solution. Do NOT force the foreskin to retract - only retract the foreskin once able to, gently for cleaning. Always replace the foreskin in the normal anatomic position over the glans.

## 2023-11-03 NOTE — PLAN OF CARE
SW met with pt and pt's family at bedside to complete final d/c assessment. Pt will have family help transport the pt home. SW requested f/u appts. Family did not have any additional questions or concerns for sw at this time. Rounds completed on pt. All questions addressed. Bedside nurse to discuss d/c medications. Discussed importance to attend all f/u appts and take medications as prescribed. Verbalized understanding. Case Management to sign off.     NEIL Villareal  [phone]     11/03/23 1326   Final Note   Assessment Type Final Discharge Note   Anticipated Discharge Disposition Home   What phone number can be called within the next 1-3 days to see how you are doing after discharge? [phone]   Post-Acute Status   Post-Acute Authorization Placement   Discharge Delays None known at this time

## 2023-11-12 NOTE — PROGRESS NOTES
Subjective:      Patient ID: Catherine Wilkinson is a 78 y.o. male.    Vitals:  vitals were not taken for this visit.     Chief Complaint: No chief complaint on file.    This is a 78 y.o. male who presents today with a need for a flu vaccine.  ROS   Objective:     Physical Exam    Assessment:     No diagnosis found.    Plan:       There are no diagnoses linked to this encounter.

## 2024-02-05 PROBLEM — N39.0 COMPLICATED UTI (URINARY TRACT INFECTION): Status: RESOLVED | Noted: 2023-11-02 | Resolved: 2024-02-05

## 2024-02-05 PROBLEM — N17.9 AKI (ACUTE KIDNEY INJURY): Status: RESOLVED | Noted: 2023-11-02 | Resolved: 2024-02-05

## 2025-07-23 ENCOUNTER — HOSPITAL ENCOUNTER (INPATIENT)
Facility: HOSPITAL | Age: 80
LOS: 4 days | Discharge: HOME HEALTH | DRG: 871 | End: 2025-07-27
Attending: EMERGENCY MEDICINE | Admitting: INTERNAL MEDICINE
Payer: MEDICARE

## 2025-07-23 DIAGNOSIS — N13.30 BILATERAL HYDRONEPHROSIS: ICD-10-CM

## 2025-07-23 DIAGNOSIS — N17.9 SEPSIS WITH ACUTE RENAL FAILURE WITHOUT SEPTIC SHOCK, DUE TO UNSPECIFIED ORGANISM, UNSPECIFIED ACUTE RENAL FAILURE TYPE: ICD-10-CM

## 2025-07-23 DIAGNOSIS — N32.3 DIVERTICULUM OF BLADDER: ICD-10-CM

## 2025-07-23 DIAGNOSIS — F79 INTELLECTUAL DISABILITY: Chronic | ICD-10-CM

## 2025-07-23 DIAGNOSIS — A41.9 SEPSIS WITH ACUTE RENAL FAILURE WITHOUT SEPTIC SHOCK, DUE TO UNSPECIFIED ORGANISM, UNSPECIFIED ACUTE RENAL FAILURE TYPE: ICD-10-CM

## 2025-07-23 DIAGNOSIS — A41.9 SEPSIS, DUE TO UNSPECIFIED ORGANISM, UNSPECIFIED WHETHER ACUTE ORGAN DYSFUNCTION PRESENT: Primary | ICD-10-CM

## 2025-07-23 DIAGNOSIS — B96.20 BACTEREMIA DUE TO ESCHERICHIA COLI: ICD-10-CM

## 2025-07-23 DIAGNOSIS — D64.9 NORMOCYTIC ANEMIA: ICD-10-CM

## 2025-07-23 DIAGNOSIS — N17.9 AKI (ACUTE KIDNEY INJURY): ICD-10-CM

## 2025-07-23 DIAGNOSIS — G93.40 ACUTE ENCEPHALOPATHY: ICD-10-CM

## 2025-07-23 DIAGNOSIS — N30.00 ACUTE CYSTITIS WITHOUT HEMATURIA: ICD-10-CM

## 2025-07-23 DIAGNOSIS — N32.0 BLADDER-NECK OBSTRUCTION: ICD-10-CM

## 2025-07-23 DIAGNOSIS — R78.81 BACTEREMIA DUE TO ESCHERICHIA COLI: ICD-10-CM

## 2025-07-23 DIAGNOSIS — R65.20 SEPSIS WITH ACUTE RENAL FAILURE WITHOUT SEPTIC SHOCK, DUE TO UNSPECIFIED ORGANISM, UNSPECIFIED ACUTE RENAL FAILURE TYPE: ICD-10-CM

## 2025-07-23 DIAGNOSIS — N39.0 URINARY TRACT INFECTION WITHOUT HEMATURIA, SITE UNSPECIFIED: ICD-10-CM

## 2025-07-23 DIAGNOSIS — K56.41 FECAL IMPACTION: ICD-10-CM

## 2025-07-23 DIAGNOSIS — D69.6 THROMBOCYTOPENIA: ICD-10-CM

## 2025-07-23 DIAGNOSIS — B99.9 FEVER DUE TO INFECTION: ICD-10-CM

## 2025-07-23 DIAGNOSIS — E87.20 METABOLIC ACIDOSIS: ICD-10-CM

## 2025-07-23 LAB
ABSOLUTE EOSINOPHIL (OHS): 0.03 K/UL
ABSOLUTE MONOCYTE (OHS): 0.71 K/UL (ref 0.3–1)
ABSOLUTE NEUTROPHIL COUNT (OHS): 2.7 K/UL (ref 1.8–7.7)
ALBUMIN SERPL BCP-MCNC: 2.8 G/DL (ref 3.5–5.2)
ALP SERPL-CCNC: 117 UNIT/L (ref 40–150)
ALT SERPL W/O P-5'-P-CCNC: 22 UNIT/L (ref 10–44)
AMMONIA PLAS-SCNC: 42 UMOL/L (ref 10–50)
ANION GAP (OHS): 8 MMOL/L (ref 8–16)
AST SERPL-CCNC: 36 UNIT/L (ref 11–45)
BACTERIA #/AREA URNS AUTO: ABNORMAL /HPF
BASOPHILS # BLD AUTO: 0.02 K/UL
BASOPHILS NFR BLD AUTO: 0.5 %
BILIRUB SERPL-MCNC: 0.7 MG/DL (ref 0.1–1)
BILIRUB UR QL STRIP.AUTO: NEGATIVE
BUN SERPL-MCNC: 34 MG/DL (ref 8–23)
CALCIUM SERPL-MCNC: 8.8 MG/DL (ref 8.7–10.5)
CHLORIDE SERPL-SCNC: 114 MMOL/L (ref 95–110)
CK SERPL-CCNC: 68 U/L (ref 20–200)
CLARITY UR: ABNORMAL
CO2 SERPL-SCNC: 18 MMOL/L (ref 23–29)
COLOR UR AUTO: ABNORMAL
CREAT SERPL-MCNC: 1.9 MG/DL (ref 0.5–1.4)
ERYTHROCYTE [DISTWIDTH] IN BLOOD BY AUTOMATED COUNT: 13.5 % (ref 11.5–14.5)
FIO2: 21 %
GFR SERPLBLD CREATININE-BSD FMLA CKD-EPI: 36 ML/MIN/1.73/M2
GLUCOSE SERPL-MCNC: 115 MG/DL (ref 70–110)
GLUCOSE UR QL STRIP: NEGATIVE
HCT VFR BLD AUTO: 29.7 % (ref 40–54)
HGB BLD-MCNC: 9.3 GM/DL (ref 14–18)
HGB UR QL STRIP: ABNORMAL
HYALINE CASTS UR QL AUTO: 0 /LPF (ref 0–1)
IMM GRANULOCYTES # BLD AUTO: 0.02 K/UL (ref 0–0.04)
IMM GRANULOCYTES NFR BLD AUTO: 0.5 % (ref 0–0.5)
KETONES UR QL STRIP: NEGATIVE
LACTATE SERPL-SCNC: 3.1 MMOL/L (ref 0.5–2.2)
LDH SERPL L TO P-CCNC: 3.1 MMOL/L (ref 0.5–2.2)
LEUKOCYTE ESTERASE UR QL STRIP: ABNORMAL
LYMPHOCYTES # BLD AUTO: 0.67 K/UL (ref 1–4.8)
MAGNESIUM SERPL-MCNC: 2.4 MG/DL (ref 1.6–2.6)
MCH RBC QN AUTO: 29.7 PG (ref 27–31)
MCHC RBC AUTO-ENTMCNC: 31.3 G/DL (ref 32–36)
MCV RBC AUTO: 95 FL (ref 82–98)
MICROSCOPIC COMMENT: ABNORMAL
NITRITE UR QL STRIP: NEGATIVE
NUCLEATED RBC (/100WBC) (OHS): 0 /100 WBC
PH UR STRIP: 7 [PH]
PHOSPHATE SERPL-MCNC: 2.2 MG/DL (ref 2.7–4.5)
PLATELET # BLD AUTO: 101 K/UL (ref 150–450)
PMV BLD AUTO: 9.9 FL (ref 9.2–12.9)
POC PERFORMED BY: ABNORMAL
POTASSIUM SERPL-SCNC: 4.6 MMOL/L (ref 3.5–5.1)
PROCALCITONIN SERPL-MCNC: 11.22 NG/ML
PROT SERPL-MCNC: 7.9 GM/DL (ref 6–8.4)
PROT UR QL STRIP: ABNORMAL
RBC # BLD AUTO: 3.13 M/UL (ref 4.6–6.2)
RBC #/AREA URNS AUTO: 85 /HPF (ref 0–4)
RELATIVE EOSINOPHIL (OHS): 0.7 %
RELATIVE LYMPHOCYTE (OHS): 16.1 % (ref 18–48)
RELATIVE MONOCYTE (OHS): 17.1 % (ref 4–15)
RELATIVE NEUTROPHIL (OHS): 65.1 % (ref 38–73)
SODIUM SERPL-SCNC: 140 MMOL/L (ref 136–145)
SP GR UR STRIP: 1.01
SPECIMEN SOURCE: ABNORMAL
SQUAMOUS #/AREA URNS AUTO: 1 /HPF
TSH SERPL-ACNC: 2.93 UIU/ML (ref 0.4–4)
UROBILINOGEN UR STRIP-ACNC: NEGATIVE EU/DL
WBC # BLD AUTO: 4.15 K/UL (ref 3.9–12.7)
WBC #/AREA URNS AUTO: >100 /HPF (ref 0–5)
WBC CLUMPS UR QL AUTO: ABNORMAL

## 2025-07-23 PROCEDURE — 96361 HYDRATE IV INFUSION ADD-ON: CPT

## 2025-07-23 PROCEDURE — 82550 ASSAY OF CK (CPK): CPT | Performed by: EMERGENCY MEDICINE

## 2025-07-23 PROCEDURE — 12000002 HC ACUTE/MED SURGE SEMI-PRIVATE ROOM

## 2025-07-23 PROCEDURE — 99285 EMERGENCY DEPT VISIT HI MDM: CPT | Mod: 25

## 2025-07-23 PROCEDURE — 83605 ASSAY OF LACTIC ACID: CPT | Performed by: EMERGENCY MEDICINE

## 2025-07-23 PROCEDURE — 63600175 PHARM REV CODE 636 W HCPCS: Performed by: EMERGENCY MEDICINE

## 2025-07-23 PROCEDURE — 83735 ASSAY OF MAGNESIUM: CPT | Performed by: EMERGENCY MEDICINE

## 2025-07-23 PROCEDURE — 87154 CUL TYP ID BLD PTHGN 6+ TRGT: CPT

## 2025-07-23 PROCEDURE — 84443 ASSAY THYROID STIM HORMONE: CPT | Performed by: EMERGENCY MEDICINE

## 2025-07-23 PROCEDURE — 83605 ASSAY OF LACTIC ACID: CPT

## 2025-07-23 PROCEDURE — 25000003 PHARM REV CODE 250: Performed by: EMERGENCY MEDICINE

## 2025-07-23 PROCEDURE — 87040 BLOOD CULTURE FOR BACTERIA: CPT

## 2025-07-23 PROCEDURE — 82140 ASSAY OF AMMONIA: CPT | Performed by: EMERGENCY MEDICINE

## 2025-07-23 PROCEDURE — 99900035 HC TECH TIME PER 15 MIN (STAT)

## 2025-07-23 PROCEDURE — 96365 THER/PROPH/DIAG IV INF INIT: CPT

## 2025-07-23 PROCEDURE — 85025 COMPLETE CBC W/AUTO DIFF WBC: CPT | Performed by: EMERGENCY MEDICINE

## 2025-07-23 PROCEDURE — 81001 URINALYSIS AUTO W/SCOPE: CPT | Performed by: EMERGENCY MEDICINE

## 2025-07-23 PROCEDURE — 84145 PROCALCITONIN (PCT): CPT | Performed by: EMERGENCY MEDICINE

## 2025-07-23 PROCEDURE — 84100 ASSAY OF PHOSPHORUS: CPT | Performed by: EMERGENCY MEDICINE

## 2025-07-23 PROCEDURE — 87186 SC STD MICRODIL/AGAR DIL: CPT | Performed by: EMERGENCY MEDICINE

## 2025-07-23 PROCEDURE — 82565 ASSAY OF CREATININE: CPT | Performed by: EMERGENCY MEDICINE

## 2025-07-23 RX ORDER — SODIUM CHLORIDE 0.9 % (FLUSH) 0.9 %
10 SYRINGE (ML) INJECTION EVERY 12 HOURS PRN
Status: DISCONTINUED | OUTPATIENT
Start: 2025-07-23 | End: 2025-07-27 | Stop reason: HOSPADM

## 2025-07-23 RX ORDER — ACETAMINOPHEN 650 MG/1
650 SUPPOSITORY RECTAL
Status: COMPLETED | OUTPATIENT
Start: 2025-07-23 | End: 2025-07-23

## 2025-07-23 RX ORDER — ENOXAPARIN SODIUM 100 MG/ML
30 INJECTION SUBCUTANEOUS EVERY 24 HOURS
Status: DISCONTINUED | OUTPATIENT
Start: 2025-07-24 | End: 2025-07-27 | Stop reason: HOSPADM

## 2025-07-23 RX ORDER — IBUPROFEN 200 MG
24 TABLET ORAL
Status: DISCONTINUED | OUTPATIENT
Start: 2025-07-23 | End: 2025-07-27 | Stop reason: HOSPADM

## 2025-07-23 RX ORDER — IBUPROFEN 200 MG
16 TABLET ORAL
Status: DISCONTINUED | OUTPATIENT
Start: 2025-07-23 | End: 2025-07-27 | Stop reason: HOSPADM

## 2025-07-23 RX ORDER — SODIUM CHLORIDE, SODIUM LACTATE, POTASSIUM CHLORIDE, CALCIUM CHLORIDE 600; 310; 30; 20 MG/100ML; MG/100ML; MG/100ML; MG/100ML
INJECTION, SOLUTION INTRAVENOUS CONTINUOUS
Status: ACTIVE | OUTPATIENT
Start: 2025-07-24 | End: 2025-07-24

## 2025-07-23 RX ORDER — GLUCAGON 1 MG
1 KIT INJECTION
Status: DISCONTINUED | OUTPATIENT
Start: 2025-07-23 | End: 2025-07-27 | Stop reason: HOSPADM

## 2025-07-23 RX ORDER — NALOXONE HCL 0.4 MG/ML
0.02 VIAL (ML) INJECTION
Status: DISCONTINUED | OUTPATIENT
Start: 2025-07-23 | End: 2025-07-27 | Stop reason: HOSPADM

## 2025-07-23 RX ADMIN — ACETAMINOPHEN 650 MG: 650 SUPPOSITORY RECTAL at 09:07

## 2025-07-23 RX ADMIN — VANCOMYCIN HYDROCHLORIDE 1000 MG: 1 INJECTION, POWDER, LYOPHILIZED, FOR SOLUTION INTRAVENOUS at 09:07

## 2025-07-23 RX ADMIN — SODIUM CHLORIDE 1250 ML: 9 INJECTION, SOLUTION INTRAVENOUS at 08:07

## 2025-07-23 RX ADMIN — PIPERACILLIN SODIUM AND TAZOBACTAM SODIUM 4.5 G: 4; .5 INJECTION, POWDER, LYOPHILIZED, FOR SOLUTION INTRAVENOUS at 08:07

## 2025-07-24 PROBLEM — G93.40 ACUTE ENCEPHALOPATHY: Status: ACTIVE | Noted: 2025-07-24

## 2025-07-24 PROBLEM — D69.6 THROMBOCYTOPENIA: Status: ACTIVE | Noted: 2025-07-24

## 2025-07-24 PROBLEM — D64.9 NORMOCYTIC ANEMIA: Status: ACTIVE | Noted: 2025-07-24

## 2025-07-24 PROBLEM — N30.00 ACUTE CYSTITIS WITHOUT HEMATURIA: Status: ACTIVE | Noted: 2025-07-24

## 2025-07-24 PROBLEM — E87.20 METABOLIC ACIDOSIS: Status: ACTIVE | Noted: 2025-07-24

## 2025-07-24 PROBLEM — N13.30 BILATERAL HYDRONEPHROSIS: Status: ACTIVE | Noted: 2025-07-24

## 2025-07-24 PROBLEM — K56.41 FECAL IMPACTION: Status: ACTIVE | Noted: 2025-07-24

## 2025-07-24 LAB
ACB COMPLEX DNA BLD POS QL NAA+NON-PROBE: NOT DETECTED
B FRAGILIS DNA BLD POS QL NAA+PROBE: NOT DETECTED
C ALBICANS DNA BLD POS QL NAA+PROBE: NOT DETECTED
C AURIS DNA BLD POS QL NAA+NON-PROBE: NOT DETECTED
C GATTII+NEOFOR DNA CSF QL NAA+NON-PROBE: NOT DETECTED
C GLABRATA DNA BLD POS QL NAA+PROBE: NOT DETECTED
C KRUSEI DNA BLD POS QL NAA+PROBE: NOT DETECTED
C PARAP DNA BLD POS QL NAA+PROBE: NOT DETECTED
C TROPICLS DNA BLD POS QL NAA+PROBE: NOT DETECTED
COLISTIN RES MCR-1 ISLT/SPM QL: NOT DETECTED
E CLOAC COMP DNA BLD POS QL NAA+PROBE: NOT DETECTED
E COLI DNA BLD POS QL NAA+PROBE: DETECTED
E FAECALIS+OTHR E SP RRNA BLD POS FISH: NOT DETECTED
E FAECIUM HSP60 BLD POS QL PROBE: NOT DETECTED
ENTEROBACTERALES DNA BLD POS NAA+N-PRB: ABNORMAL
ESBL CFT TO CFT-CLAV IC RTO BD POS IMP: NOT DETECTED
FERRITIN SERPL-MCNC: 365.4 NG/ML (ref 20–300)
FOLATE SERPL-MCNC: 13 NG/ML (ref 4–24)
GP B STREP DNA CSF QL NAA+NON-PROBE: NOT DETECTED
HAEM INFLU DNA CSF QL NAA+NON-PROBE: NOT DETECTED
IMP CARBAPENEMASE ISLT QL IA.RAPID: NOT DETECTED
IRON SATN MFR SERPL: 7 % (ref 20–50)
IRON SERPL-MCNC: 14 UG/DL (ref 45–160)
K OXYTOCA OMPA BLD POS QL PROBE: NOT DETECTED
KLEBSIELLA SP DNA BLD POS QL NAA+NON-PRB: NOT DETECTED
KLEBSIELLA SP DNA BLD POS QL NAA+NON-PRB: NOT DETECTED
KPC CARBAPENEMASE ISLT QL IA.RAPID: NOT DETECTED
L MONOCYTOG DNA CSF QL NAA+NON-PROBE: NOT DETECTED
LACTATE SERPL-SCNC: 1 MMOL/L (ref 0.5–2.2)
MECA+MECC NOSE QL NAA+PROBE: ABNORMAL
MECA+MECC+MREJ ISLT/SPM QL: ABNORMAL
N MEN DNA CSF QL NAA+NON-PROBE: NOT DETECTED
NDM CARBAPENEMASE ISLT QL IA.RAPID: NOT DETECTED
OXA-48-LIKE CRBPNASE ISLT QL IA.RAPID: NOT DETECTED
P AERUGINOSA DNA BLD POS QL NAA+PROBE: NOT DETECTED
PROTEUS SP DNA BLD POS QL NAA+PROBE: NOT DETECTED
S AUREUS DNA BLD POS QL NAA+PROBE: NOT DETECTED
S ENT+BONG DNA STL QL NAA+NON-PROBE: NOT DETECTED
S EPIDERMIDIS HSP60 BLD POS QL PROBE: NOT DETECTED
S LUGDUNENSIS SODA BLD POS QL PROBE: NOT DETECTED
S MALTOPH DNA BLD POS QL NAA+PROBE: NOT DETECTED
S MARCESCENS DNA BLD POS QL NAA+PROBE: NOT DETECTED
S PNEUM DNA CSF QL NAA+NON-PROBE: NOT DETECTED
S PYOGENES HSP60 BLD POS QL PROBE: NOT DETECTED
STAPH SP TUF BLD POS QL PROBE: NOT DETECTED
STREPTOCOCCUS SP TUF BLD POS QL PROBE: NOT DETECTED
TIBC SERPL-MCNC: 203 UG/DL (ref 250–450)
TRANSFERRIN SERPL-MCNC: 137 MG/DL (ref 200–375)
VAN(A+B+C1+C2) GENES ISLT/SPM: ABNORMAL
VANCOMYCIN SERPL-MCNC: 10.9 UG/ML (ref ?–80)
VIM CARBAPENEMASE ISLT QL IA.RAPID: NOT DETECTED
VIT B12 SERPL-MCNC: 633 PG/ML (ref 210–950)

## 2025-07-24 PROCEDURE — 21400001 HC TELEMETRY ROOM

## 2025-07-24 PROCEDURE — 63600175 PHARM REV CODE 636 W HCPCS: Performed by: INTERNAL MEDICINE

## 2025-07-24 PROCEDURE — 92610 EVALUATE SWALLOWING FUNCTION: CPT

## 2025-07-24 PROCEDURE — 82728 ASSAY OF FERRITIN: CPT

## 2025-07-24 PROCEDURE — 84466 ASSAY OF TRANSFERRIN: CPT

## 2025-07-24 PROCEDURE — 63600175 PHARM REV CODE 636 W HCPCS

## 2025-07-24 PROCEDURE — 80202 ASSAY OF VANCOMYCIN: CPT | Performed by: INTERNAL MEDICINE

## 2025-07-24 PROCEDURE — 11000001 HC ACUTE MED/SURG PRIVATE ROOM

## 2025-07-24 PROCEDURE — 82746 ASSAY OF FOLIC ACID SERUM: CPT

## 2025-07-24 PROCEDURE — 83605 ASSAY OF LACTIC ACID: CPT

## 2025-07-24 PROCEDURE — 25000003 PHARM REV CODE 250: Performed by: EMERGENCY MEDICINE

## 2025-07-24 PROCEDURE — 25000003 PHARM REV CODE 250

## 2025-07-24 PROCEDURE — 25000003 PHARM REV CODE 250: Performed by: INTERNAL MEDICINE

## 2025-07-24 PROCEDURE — 99223 1ST HOSP IP/OBS HIGH 75: CPT | Mod: ,,, | Performed by: UROLOGY

## 2025-07-24 PROCEDURE — 82607 VITAMIN B-12: CPT

## 2025-07-24 PROCEDURE — 36415 COLL VENOUS BLD VENIPUNCTURE: CPT | Performed by: INTERNAL MEDICINE

## 2025-07-24 PROCEDURE — 51702 INSERT TEMP BLADDER CATH: CPT

## 2025-07-24 RX ORDER — SODIUM CHLORIDE, SODIUM LACTATE, POTASSIUM CHLORIDE, CALCIUM CHLORIDE 600; 310; 30; 20 MG/100ML; MG/100ML; MG/100ML; MG/100ML
INJECTION, SOLUTION INTRAVENOUS CONTINUOUS
Status: ACTIVE | OUTPATIENT
Start: 2025-07-24 | End: 2025-07-25

## 2025-07-24 RX ORDER — SENNOSIDES 8.6 MG/1
8.6 TABLET ORAL DAILY PRN
Status: DISCONTINUED | OUTPATIENT
Start: 2025-07-24 | End: 2025-07-24

## 2025-07-24 RX ORDER — AMLODIPINE BESYLATE 10 MG/1
10 TABLET ORAL DAILY
Status: ON HOLD | COMMUNITY
Start: 2025-06-03 | End: 2025-07-27 | Stop reason: HOSPADM

## 2025-07-24 RX ORDER — AMLODIPINE BESYLATE 5 MG/1
5 TABLET ORAL DAILY
COMMUNITY
End: 2025-07-24

## 2025-07-24 RX ORDER — POLYETHYLENE GLYCOL 3350 17 G/17G
17 POWDER, FOR SOLUTION ORAL DAILY
Status: DISCONTINUED | OUTPATIENT
Start: 2025-07-24 | End: 2025-07-27 | Stop reason: HOSPADM

## 2025-07-24 RX ORDER — CEFTRIAXONE 2 G/1
2 INJECTION, POWDER, FOR SOLUTION INTRAMUSCULAR; INTRAVENOUS
Status: DISCONTINUED | OUTPATIENT
Start: 2025-07-24 | End: 2025-07-24

## 2025-07-24 RX ORDER — SENNOSIDES 8.6 MG/1
8.6 TABLET ORAL DAILY
Status: DISCONTINUED | OUTPATIENT
Start: 2025-07-24 | End: 2025-07-27 | Stop reason: HOSPADM

## 2025-07-24 RX ADMIN — SODIUM CHLORIDE, POTASSIUM CHLORIDE, SODIUM LACTATE AND CALCIUM CHLORIDE: 600; 310; 30; 20 INJECTION, SOLUTION INTRAVENOUS at 02:07

## 2025-07-24 RX ADMIN — PIPERACILLIN SODIUM AND TAZOBACTAM SODIUM 4.5 G: 4; .5 INJECTION, POWDER, LYOPHILIZED, FOR SOLUTION INTRAVENOUS at 11:07

## 2025-07-24 RX ADMIN — ACETAMINOPHEN 325 MG: 325 SUPPOSITORY RECTAL at 02:07

## 2025-07-24 RX ADMIN — ENOXAPARIN SODIUM 30 MG: 30 INJECTION SUBCUTANEOUS at 06:07

## 2025-07-24 RX ADMIN — CEFTRIAXONE SODIUM 2 G: 2 INJECTION, POWDER, FOR SOLUTION INTRAMUSCULAR; INTRAVENOUS at 04:07

## 2025-07-24 RX ADMIN — SODIUM CHLORIDE, POTASSIUM CHLORIDE, SODIUM LACTATE AND CALCIUM CHLORIDE: 600; 310; 30; 20 INJECTION, SOLUTION INTRAVENOUS at 03:07

## 2025-07-24 RX ADMIN — SODIUM CHLORIDE, POTASSIUM CHLORIDE, SODIUM LACTATE AND CALCIUM CHLORIDE: 600; 310; 30; 20 INJECTION, SOLUTION INTRAVENOUS at 06:07

## 2025-07-24 RX ADMIN — VANCOMYCIN HYDROCHLORIDE 500 MG: 500 INJECTION, POWDER, LYOPHILIZED, FOR SOLUTION INTRAVENOUS at 06:07

## 2025-07-24 RX ADMIN — POLYETHYLENE GLYCOL 3350 17 G: 17 POWDER, FOR SOLUTION ORAL at 10:07

## 2025-07-24 RX ADMIN — SENNOSIDES 8.6 MG: 8.6 TABLET, FILM COATED ORAL at 11:07

## 2025-07-24 RX ADMIN — AMPICILLIN SODIUM 2 G: 2 INJECTION, POWDER, FOR SOLUTION INTRAMUSCULAR; INTRAVENOUS at 05:07

## 2025-07-24 NOTE — CONSULTS
[facility] - Emergency Dept  Adult Nutrition  Consult Note    SUMMARY     Recommendations    Interventions:   Collaboration and referral of nutrition care,   Vitamin and mineral supplement therapy,   Medical food supplement therapy,   General healthful diet,   Texture modified diet    Recommendations:   1. Continue with texture modified diet per SLP recommendations    2. Recommend commercial beverages    3. Monitor labs and weights    Nutrition Discharge Planning     Nutrition Discharge Planning: Diet texture per SLP    Assessment and Plan    PES Statement  Inadequate energy intake related to Acute illness as evidenced by Intake <75% estimated needs, BMI  Status: New    Interventions (treatment strategy):  Texture modified diet   Commercial beverage medical food supplement therapy           Malnutrition Assessment             Weight Loss (Malnutrition):  (BMI: 14.63, underweight)                         Reason for Assessment    Reason For Assessment: consult, identified at risk by screening criteria, low BMI  Diagnosis: infection/sepsis  General Information Comments: Patient is on an IDDSI level 6: soft and bite size oral diet with decreased intake.  Patient admitted with sepsis with PMH of Intellectual disability   BMI: 14.63, underweight.  R lower extremity amputation present.  RD unable to determine weight loss at this time.  Patient remains in the ED.  RD to recommend devan and commercial beverages to help promote nutrition status.  Labs reviewed.  NKFA.  LBM: 7/24/25.  RD to complete full NFPE at follow up visit and continue to monitor po intake/po intake tolerance.    Patient Active Problem List   Diagnosis    Intellectual disability    Candidal balanoposthitis    Hydronephrosis    Sepsis with acute organ dysfunction without septic shock     Past Medical History:   Diagnosis Date    Hypertension     MR (mental retardation)        Nutrition/Diet History    Spiritual, Cultural Beliefs, Zoroastrianism Practices, Values  "that Affect Care: no  Food Allergies: NKFA  Factors Affecting Nutritional Intake: decreased appetite  Nutrition-related SDOH: Unable to assess at this time    Anthropometrics    Height: 5' 5.98" (167.6 cm)  Height (inches): 65.98 in  Height Method: Stated  Weight: 41.1 kg (90 lb 9.7 oz)  Weight (lb): 90.61 lb  Weight Method: Stated  Ideal Body Weight (IBW), Male: 141.88 lb  % Ideal Body Weight, Male (lb): 63.86 %  % Ideal Body Weight Malnutrition: less than 70% -severe deficit  BMI (Calculated): 14.6  BMI Grade: less than 18.5 - underweight  Amputation %: 5.9  Total Amputation %: 5.9  Amputation Ideal Body Weight (IBW), Male (lb): 135.98 lb  Amputee BMI (kg/m2): 15.59 kg/m2  Additional Documentation: Amputations Present (Ideal Body Weight)    Lab/Procedures/Meds    Pertinent Labs Reviewed: reviewed  Pertinent Medications Reviewed: reviewed  BMP  Lab Results   Component Value Date     07/23/2025    K 4.6 07/23/2025     (H) 07/23/2025    CO2 18 (L) 07/23/2025    BUN 34 (H) 07/23/2025    CREATININE 1.9 (H) 07/23/2025    CALCIUM 8.8 07/23/2025    ANIONGAP 8 07/23/2025    EGFRNORACEVR 36 (L) 07/23/2025     No results found for: "LABA1C", "HGBA1C"  Lab Results   Component Value Date    CALCIUM 8.8 07/23/2025    PHOS 2.2 (L) 07/23/2025     Scheduled Meds:   enoxparin  30 mg Subcutaneous Daily    piperacillin-tazobactam (Zosyn) IV (PEDS and ADULTS) (extended infusion is not appropriate)  4.5 g Intravenous Q12H    polyethylene glycol  17 g Oral Daily    senna  8.6 mg Oral Daily     Continuous Infusions:   lactated ringers   Intravenous Continuous 100 mL/hr at 07/24/25 0244 New Bag at 07/24/25 0244     PRN Meds:.  Current Facility-Administered Medications:     dextrose 50%, 12.5 g, Intravenous, PRN    dextrose 50%, 25 g, Intravenous, PRN    glucagon (human recombinant), 1 mg, Intramuscular, PRN    glucose, 16 g, Oral, PRN    glucose, 24 g, Oral, PRN    naloxone, 0.02 mg, Intravenous, PRN    sodium chloride 0.9%, " 10 mL, Intravenous, Q12H PRN    Pharmacy to dose Vancomycin consult, , , Once **AND** vancomycin - pharmacy to dose, , Intravenous, pharmacy to manage frequency    Estimated/Assessed Needs    Weight Used For Calorie Calculations: 41.1 kg (90 lb 9.7 oz)  Energy Calorie Requirements (kcal): 30-40kcals/day (1233-1644kcals/day)  Energy Need Method: Kcal/kg  Protein Requirements: 1.5g/kg (62g/day)  Weight Used For Protein Calculations: 41.1 kg (90 lb 9.7 oz)  Fluid Requirements (mL): 1ml/kcal  Estimated Fluid Requirement Method: RDA Method  RDA Method (mL): 30  CHO Requirement: 225      Nutrition Prescription Ordered    Current Diet Order: IDDSI level 6: soft and bite size, thin liquids  Oral Nutrition Supplement: Quan BID    Evaluation of Received Nutrient/Fluid Intake    I/O: 7/24/25: +833ml since admit  Energy Calories Required: not meeting needs  Protein Required: not meeting needs  Fluid Required: not meeting needs  Comments: LBM: 7/24/25  Tolerance: other (see comments) (monitoring po intake and tolerance)  % Intake of Estimated Energy Needs: 25 - 50 %  % Meal Intake: 25 - 50 %    PES Statement  Inadequate energy intake related to Acute illness as evidenced by Intake <75% estimated needs, BMI  Status: New    Nutrition Risk    Level of Risk/Frequency of Follow-up: moderate (Follow up: 2x per week)       Monitor and Evaluation    Monitor and Evaluation: Energy intake, Protein intake, Carbohydrate intake, Diet order, Weight, Beliefs and attitudes, Electrolyte and renal panel, Glucose/endocrine profile, Gastrointestinal profile, Inflammatory profile, Lipid profile, Nutrition focused physical findings, Skin       Nutrition Follow-Up    RD Follow-up?: Yes  Haydee Mcclure, MS, RDN, LDN

## 2025-07-24 NOTE — PROGRESS NOTES
[facility] Progress Note      Patient Name: Catherine Wilkinson  MRN: [id number]  Admission Date: 7/23/2025  Hospital Length of Stay: 1 days  Attending Provider: Jacquelin Fisher MD  Primary Care Provider: Eitan Dhaliwal MD  Principal Problem: Sepsis with acute organ dysfunction without septic shock     SUBJECTIVE/INTERVAL HISTORY:       Care givers at bedside this am. Patient still febrile but improving at 101.4F this AM. On vanc, ceftriaxone, ampicillin for meningitis coverage. Per caregivers, patient back to mental baseline and less agitated. Patient follows commands, tracks with eyes when spoken to. Groans intermittently which is his baseline.    MEDICATIONS:      Scheduled Medications    ampicillin IV (PEDS and ADULTS)  2 g Intravenous Q8H    cefTRIAXone (Rocephin) IV (PEDS and ADULTS)  2 g Intravenous Q12H    enoxparin  30 mg Subcutaneous Daily    polyethylene glycol  17 g Oral Daily      PRN Medications     Current Facility-Administered Medications:     dextrose 50%, 12.5 g, Intravenous, PRN    dextrose 50%, 25 g, Intravenous, PRN    glucagon (human recombinant), 1 mg, Intramuscular, PRN    glucose, 16 g, Oral, PRN    glucose, 24 g, Oral, PRN    naloxone, 0.02 mg, Intravenous, PRN    senna, 8.6 mg, Oral, Daily PRN    sodium chloride 0.9%, 10 mL, Intravenous, Q12H PRN    Pharmacy to dose Vancomycin consult, , , Once **AND** vancomycin - pharmacy to dose, , Intravenous, pharmacy to manage frequency  IV Fluids    lactated ringers   Intravenous Continuous 100 mL/hr at 07/24/25 0244 New Bag at 07/24/25 0244             OBJECTIVE DATA:      Weight  Weight: 41.1 kg (90 lb 9.8 oz)  Body mass index is 14.63 kg/m².    Latest Vitals Vital range last 24 hours   Temp: (!) 101.4 °F (38.6 °C) (07/24/25 0408)  Pulse: 81 (07/24/25 0500)  Resp: (!) 24 (07/24/25 0500)  BP: 134/60 (07/24/25 0431)  SpO2: 95 % (07/24/25 0500) Temp:  [101 °F (38.3 °C)-103.1 °F (39.5 °C)] 101.4 °F (38.6 °C)  Pulse:  [] 81  Resp:  [20-24] 24  SpO2:  [95  "%-100 %] 95 %  BP: (112-142)/(56-71) 134/60     In/out Last 24 hours In/Out Since Admission     Intake/Output Summary (Last 24 hours) at 7/24/2025 0617  Last data filed at 7/24/2025 0610  Gross per 24 hour   Intake 1583 ml   Output 750 ml   Net 833 ml    Net IO Since Admission: 833 mL [07/24/25 0617]       Physical Exam  Constitutional:  Alert, moving extremities spontaneously, interactive  HEENT: Sclera anicteric, Pupils equal, round and reactive to light, extraocular motions intact. Dry mucous membranes   Neck: No JVD, no carotid bruits, no nuchal rigidity or photophobia.   Cardiovascular:  Tachycardic, regular rhythm  Pulmonary: Clear to auscultation bilaterally  Abdominal: Abdomen taught, not distended  Skin: No ecchymosis, erythema, or ulcers  Neuro/Psych:  Patient unable to participate in neurologic exam however is moving his extremities spontaneously.  Alert and oriented times 0.  Groans in response to questioning.  Near approximate baseline per caregiver      Labs    Recent Labs   Lab 07/23/25  2030   MG 2.4   PHOS 2.2*       No results for input(s): "TROPONINI", "BNP" in the last 168 hours.    Glucose Trends  Recent Labs   Lab 07/23/25 2030   *       Last Urinalysis  Recent Labs   Lab 07/23/25 2140   COLORU Strawberry*   APPEARANCEUA Cloudy*   SPECGRAV 1.010   PHUR 7.0   PROTEINUA 2+*   OCCULTUA 3+*   NITRITE Negative   UROBILINOGEN Negative   BILIRUBINUA Negative   LEUKOCYTESUR 3+*   RBCUA 85*   WBCUA >100*   BACTERIA Many*   HYALINECASTS 0        Microbiology  Microbiology Results (last 7 days)       Procedure Component Value Units Date/Time    MRSA Screen by PCR [[id number]]     Order Status: Sent Specimen: Nasal Swab     Urine culture [[id number]] Collected: 07/23/25 2140    Order Status: Sent Specimen: Urine Updated: 07/23/25 2207    Blood culture x two cultures. Draw prior to antibiotics [[id number]] Collected: 07/23/25 2021    Order Status: Sent Specimen: Blood from Peripheral, Hand, Left " "Updated: 07/23/25 2030    Blood culture x two cultures. Draw prior to antibiotics [[id number]] Collected: 07/23/25 2020    Order Status: Sent Specimen: Blood from Peripheral, Forearm, Right Updated: 07/23/25 2030          Antibiotics (From admission, onward)      Start     Stop Route Frequency Ordered    07/24/25 0600  ampicillin (OMNIPEN) 2 g in 0.9% NaCl 100 mL IVPB (MB+)         -- IV Every 8 hours 07/24/25 0311 07/24/25 0415  cefTRIAXone injection 2 g         07/29/25 0414 IV Every 12 hours (non-standard times) 07/24/25 0311 07/23/25 2301  vancomycin - pharmacy to dose  (ED Adult Sepsis Treatment)        Placed in "And" Linked Group    07/28/25 2300 IV pharmacy to manage frequency 07/23/25 2301             EKG  Results for orders placed or performed during the hospital encounter of 11/01/23   EKG 12-lead    Collection Time: 11/01/23  6:00 PM    Narrative    Test Reason : N39.0,    Vent. Rate : 093 BPM     Atrial Rate : 093 BPM     P-R Int : 124 ms          QRS Dur : 068 ms      QT Int : 336 ms       P-R-T Axes : 069 -04 052 degrees     QTc Int : 417 ms    Normal sinus rhythm  Normal ECG  When compared with ECG of 29-JAN-2021 16:47,  ST no longer elevated in Inferior leads  T wave inversion no longer evident in Inferior leads  T wave amplitude has increased in Anterior leads  Confirmed by Benja HERNANDEZ, Kal HIGH ([id number]) on 11/2/2023 6:00:05 PM    Referred By: [email or web address]   SELF           Confirmed By:Kla Yousif MD        I have personally reviewed the above EKG    Radiology  CT Renal Stone Study Abd Pelvis WO   Final Result      Moderate urinary bladder distension with severe right, moderate left hydronephrosis.  Findings may be due to bladder outlet obstruction.      Large volume of stool in the rectum, correlate for constipation/fecal impaction.      Several urinary bladder calculi.         Electronically signed by: Donta Arora   Date:    07/24/2025   Time:    00:24      X-Ray Chest 1 View   Final " Result      No acute abnormality.         Electronically signed by: Donta Arora   Date:    07/23/2025   Time:    21:26      CT Head Without Contrast    (Results Pending)        I have personally reviewed the above imaging      Assessment/Plan:     Catherine Wilkinson is a 79 y.o. male with intellectual disability, chronic right-sided hydronephrosis, hypertension who presented to [facility] on 7/23/[facility]25 for fever/decreased po intake/lethargy h50-50ive.  In the ED, patient tachycardic to low 100s with soft blood pressures and fever with T-max of 103.1°.  Physical exam largely unrevealing - baseline per caregiver. Labs remarkable for stage I acute kidney injury with non anion gap metabolic acidosis, lactic acid 3.1.  Urinalysis notable for greater than 100 WBCs, many bacteria, leuk esterase, 2+ protein, 3+ blood. Patient is admitted to [facility] for sepsis secondary to urinary tract infection on the background of known chronic right-sided hydronephrosis.     Sepsis with Acute Organ Dysfunction  Complicated Urinary Tract Infection in setting of L acute hydronephrosis, R chronic hydronephrosis  Acute Metabolic Encephalopathy   - Source: Urine  - UA: >100WBCs, many bacteria, leuk esterase, 3+ blood   - End Organ dysfunction: CASIE, encephalopathy  - SIRS 3/4, qSOFA 2/3, MEWS 4, Gestalt +  - Continue Vanc / Zosyn renally dosed   - Continue volume resuscitation   - Trend lactate  - CT AP w/o chronic R hydronephrosis but atrophic. L sided nephrosis as well with bladder calculi  - Urology eval in AM  - febrile despite broad spec abx but improving. Covering for meningitis       Stage 1 Acute Kidney Injury on Chronic Kidney Disease Stage 2  Chronic R Sided Hydronephrosis   Acute L sided hydronephrosis  Bladder calculi   - Baseline Cr 1.0, GFR >60 now 1.9, 36 respectively   - Suspect 2/2 distributive volume loss iso sepsis on background of known R sided hydronephrosis   - Continue volume resuscitation   - CT A/P w/o  - Strict I/Os  -  Renally dose medication  - Urology eval in AM       Non-Anion Gap Metabolic Acidosis   Anion Gap Metabolic Acidosis   - AG 8, HCO3- 18, LA 3.1  - Mixed gap/non-gap  - No diarrhea   - Trend LA, continue IVF  - Daily BMP, if no improvement consider UAG       Normocytic Anemia   - Hgb 9.3, MCV 95  - No recent bleeding/melena   - Not UTD with age appropriate Ca screening   - Check Fe studies, B12, B9        Thrombocytopenia   - 109 on admit, relatively chronic near baseline   - CTM while inpatient        Hypertension   - Holding home antihypertensives        Intellectual Disability  - Unifying diagnosis not available in chart  - Has had issues since birth per sister  - Patient with contractions of extremities and abdominal wall at baseline. CP?  - Near baseline per caregiver       Indwelling Lines:     Peripheral IV 07/23/25 2000 20 G Anterior;Right Upper Arm (Active)          VTE Ppx:  VTE Risk Mitigation (From admission, onward)           Ordered     enoxaparin injection 30 mg  Daily         07/23/25 2259     IP VTE HIGH RISK PATIENT  Once         07/23/25 2259     Place sequential compression device  Until discontinued         07/23/25 2259                  Diet: NPO  Code: Full  Dispo: Floor. Tele. Pending improved hemodynamics, renal function, urology evaluation.  Estimated LOS: 2-3 days     Patient seen and discussed with attending physician: Jacquelin Fisher MD. Attestation may differ from plan, please appreciate.       Leighton Lee MD  [facility] Medicine Hospitalist Phone numbers:   [facility] Hospitalist Medicine Team A (Dolores/Phillip): [phone]  [facility] Hospitalist Medicine Team B (Leonardo/Melony):  [phone]
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
## 2025-07-24 NOTE — PT/OT/SLP EVAL
Speech Language Pathology Evaluation  Bedside Swallow    Patient Name:  Catherine Wilkinson   MRN:  [id number]  Admitting Diagnosis: Sepsis with acute organ dysfunction without septic shock    Recommendations:                 General Recommendations:  Follow-up not indicated  Diet recommendations:  Soft & Bite Sized Diet - IDDSI Level 6, Thin liquids - IDDSI Level 0   Aspiration Precautions: 1 bite/sip at a time, Alternating bites/sips, Feed only when awake/alert, HOB to 90 degrees, Meds whole 1 at a time, Remain upright 30 minutes post meal, and Small bites/sips   General Precautions: Standard, fall (amputee, intellectual disabilty, nonverbal)  Communication strategies:  Pt nonverbal, did not follow commands. Pt's sister primary  and was at bedside  Discharge recommendations:  No Therapy Indicated   Barriers to Discharge:  None    Assessment:     Catherine Wilkinson is a 79 y.o. male with an admission diagnosis of sepsis with acute organ dysfunction without septic shock and is safe to initate soft and bite-sized diet with thin liquids following standard swallow precautions.     History:     Past Medical History:   Diagnosis Date    Hypertension     MR (mental retardation)        Past Surgical History:   Procedure Laterality Date    AMPUTATION         Social History: Patient lives with sister.    Prior Intubation HX:  none on file    Modified Barium Swallow: none    Chest X-Rays: 7/23/25: no acute abnormality     Prior diet: soft/thin.    Subjective   Pt awake laying in bed upon SLP entry. Sister present at bedside who was the primary  during session and assisted SLP in positioning Pt appropriately for PO intake. SLP spoke with RN prior to swallow eval.     Patient goals: none verbally stated, however, Pt reaching for all PO presented to him     Pain/Comfort:  Pain Rating 1: 0/10    Respiratory Status: Room air    Objective:     Oral Musculature Evaluation  Oral Musculature:  (appeared functional, not assessed 2/2  unable to follow commands)  Dentition: edentulous  Secretion Management: adequate  Mucosal Quality: good  Oral Labial Strength and Mobility: functional pursing (unable to follow commands for retraction, protrustion, or lateralization. Pt opened mouth upon command to protrude)  Lingual Strength and Mobility:  (unable to follow commands)  Velar Elevation:  (not assessed 2/2 unable to follow commands)  Buccal Strength and Mobility:  (not assessed 2/2 unable to follow commands)  Volitional Cough:  (non observed)  Volitional Swallow:  (timely upon visualization)  Voice Prior to PO Intake:  (no voice output)    Bedside Swallow Eval:   Consistencies Assessed:  Thin liquids via cup x2, via straw x6  Puree pudding via tsp x5  Soft solids diced peaches x2  Solids liat cracker x2     Oral Phase:   Timely bolus prep, mastication, and AP transit across all po trials.   Tongue thrust movement observed while manipulating bolus  Minimal lingual oral residue following liat cracker x1 - cleared with liquid wash.    Pharyngeal Phase:   no overt clinical signs/symptoms of aspiration  no overt clinical signs/symptoms of pharyngeal dysphagia    Compensatory Strategies  None    Treatment:   Pt participated in [facility] eval. Pt with an intellectual disability with baseline cognitive-linguistic deficits. Pt nonverbal and did not follow commands.  Pt's sister at bedside who answered SLP question re: diet and swallowing at home in which she reported he consumed soft foods at home without difficulty. Pt consumed all PO trials without overt s/s of decreased airway protection. Pt is safe to initiate SOFT AND BITE-SIZED diet with THIN liquids following standard aspiration precautions:   -meds- whole 1 at a time  -1:1 assist with all po intake  -SMALL bites/sips  -alternate bites/sips  -1 bite/sip at a time  -HOB to 90 degrees during all po intake  -remain upright for 30 min s/p meals    Goals:   Multidisciplinary Problems       SLP Goals        Not on file              Multidisciplinary Problems (Resolved)          Problem: SLP    Goal Priority Disciplines Outcome   SLP Goal   (Resolved)     SLP Met   Description: Short Term Goals:  1. Pt will participate in a clinical swallow eval to determine least restrictive diet. - MET                       Plan:     Patient to be seen:      Plan of Care expires:  07/24/25  Plan of Care reviewed with:      SLP Follow-Up:  No       Time Tracking:     SLP Treatment Date:   07/24/25  Speech Start Time:  0935  Speech Stop Time:  0955     Speech Total Time (min):  20 min    Billable Minutes: Eval Swallow and Oral Function 20 07/24/2025

## 2025-07-24 NOTE — PROGRESS NOTES
"Pharmacokinetic Initial Assessment: IV Vancomycin    Assessment/Plan:    Initiate intravenous vancomycin with loading dose of 1000 mg once with subsequent doses when random concentrations are less than 20 mcg/mL  Desired empiric serum trough concentration is 15 to 20 mcg/mL  Draw vancomycin random level on 7/24/25 at 0430 with AM labs.  Pharmacy will continue to follow and monitor vancomycin.      Please contact pharmacy at extension [phone] with any questions regarding this assessment.     Thank you for the consult,   Airam Ram       Patient brief summary:  Catherine iWlkinson is a 79 y.o. male initiated on antimicrobial therapy with IV Vancomycin for treatment of suspected sepsis    Drug Allergies:   Review of patient's allergies indicates:  No Known Allergies    Actual Body Weight:   41.1 kg    Renal Function:   CrCl cannot be calculated (Patient's most recent lab result is older than the maximum 7 days allowed.).,     Dialysis Method (if applicable):  N/A    CBC (last 72 hours):  No results for input(s): "WHITE BLOOD CELL COUNT", "HEMOGLOBIN", "HEMATOCRIT", "PLATELETS", "GRAN%", "LYMPH%", "MONO%", "EOSINOPHIL%", "BASOPHIL%", "DIFFERENTIAL METHOD" in the last 72 hours.    Metabolic Panel (last 72 hours):  No results for input(s): "SODIUM", "POTASSIUM", "CHLORIDE", "CO2", "GLUCOSE", "BUN BLD", "CREATININE", "ALBUMIN", "BILIRUBIN TOTAL", "ALK PHOS", "AST", "ALT", "MAGNESIUM", "PHOSPHORUS" in the last 72 hours.    Drug levels (last 3 results):  No results for input(s): "VANCOMYCINRA", "VANCORANDOM", "VANCOMYCINPE", "VANCOPEAK", "VANCOMYCINTR", "VANCOTROUGH" in the last 72 hours.    Microbiologic Results:  Microbiology Results (last 7 days)       Procedure Component Value Units Date/Time    Blood culture x two cultures. Draw prior to antibiotics [[id number]]     Order Status: Sent Specimen: Blood     Blood culture x two cultures. Draw prior to antibiotics [[id number]]     Order Status: Sent Specimen: Blood             "

## 2025-07-24 NOTE — PLAN OF CARE
Nutrition Plan of Care:    Recommendations     Interventions:   Collaboration and referral of nutrition care,   Vitamin and mineral supplement therapy,   Medical food supplement therapy,   General healthful diet,   Texture modified diet     Recommendations:   1. Continue with texture modified diet per SLP recommendations    2. Recommend commercial beverages    3. Monitor labs and weights     Nutrition Discharge Planning      Nutrition Discharge Planning: Diet texture per SLP     Assessment and Plan     PES Statement  Inadequate energy intake related to Acute illness as evidenced by Intake <75% estimated needs, BMI  Status: New     Interventions (treatment strategy):  Texture modified diet   Commercial beverage medical food supplement therapy          Haydee Mcclure MS, RDN, LDN

## 2025-07-24 NOTE — ED PROVIDER NOTES
Encounter Date: 7/23/2025       History     Chief Complaint   Patient presents with    Fatigue     Malaise and Fever. R leg amputee, Began acting different, shivering, currently, febrile per EMS. Urine order reported per family.      Patient is a 79-year-old male with a history of mental retardation, hypertension, right lower extremity amputation who presents to the ER via EMS with a complaint of increased lethargy and fever.  Sister/caregiver at bedside reports 1 day of decreased responsiveness, lethargy, foul-smelling urine and fever.  Reports decreased p.o. intake in his concern for possible infectious process.  She denies any cough, rash, new medications or close contacts known to be ill.  States the last time this happened the patient had a urinary tract infection the patient is unable to contribute history secondary to baseline mental status.  On arrival he has vital signs are notable for an oral temp of 102.3° a heart rate of 100.  Concern for source of infection and as such CODE SEPSIS was initiated.     Review of patient's allergies indicates:  No Known Allergies  Past Medical History:   Diagnosis Date    Hypertension     MR (mental retardation)      Past Surgical History:   Procedure Laterality Date    AMPUTATION       No family history on file.  Social History[1]  Review of Systems   Unable to perform ROS: Patient nonverbal       Physical Exam     Initial Vitals [07/23/25 1954]   BP Pulse Resp Temp SpO2   112/68 100 20 (!) 102.3 °F (39.1 °C) 100 %      MAP       --         Physical Exam    Nursing note and vitals reviewed.  Constitutional: He appears well-developed. No distress.   Chronically ill-appearing  Contracted upper extremities  Right lower extremity amputee  Septic appearing   HENT:   Head: Normocephalic and atraumatic.   No signs of head trauma   Eyes: EOM are normal. Pupils are equal, round, and reactive to light.   Neck:   Patient actively moving his neck no meningeal signs appreciated    Normal range of motion.  Cardiovascular:  Normal heart sounds and intact distal pulses.           Tachycardic   Pulmonary/Chest:   Coarse breath sounds   Abdominal: Abdomen is scaphoid and soft.   Musculoskeletal:         General: No tenderness or edema.      Cervical back: Normal range of motion.      Comments: Upper and lower extremities are contracted  RLE amputated      Neurological: He is alert.   At baseline mental status per sister at bedside  Pt with baseline grunting.    Skin: Skin is warm. Capillary refill takes less than 2 seconds.       ED Course   Critical Care    Date/Time: 7/23/2025 10:30 PM    Performed by: Stu Turk MD  Authorized by: Stu Turk MD  Direct patient critical care time: 10 minutes  Additional history critical care time: 5 minutes  Ordering / reviewing critical care time: 10 minutes  Documentation critical care time: 15 minutes  Consulting other physicians critical care time: 5 minutes  Consult with family critical care time: 15 minutes  Other critical care time: 6 minutes  Total critical care time (exclusive of procedural time) : 66 minutes  Critical care was necessary to treat or prevent imminent or life-threatening deterioration of the following conditions: sepsis.  Critical care was time spent personally by me on the following activities: evaluation of patient's response to treatment, examination of patient, obtaining history from patient or surrogate, ordering and performing treatments and interventions, ordering and review of laboratory studies, ordering and review of radiographic studies, re-evaluation of patient's condition and review of old charts.      Labs Reviewed   COMPREHENSIVE METABOLIC PANEL - Abnormal       Result Value    Sodium 140      Potassium 4.6      Chloride 114 (*)     CO2 18 (*)     Glucose 115 (*)     BUN 34 (*)     Creatinine 1.9 (*)     Calcium 8.8      Protein Total 7.9      Albumin 2.8 (*)     Bilirubin Total 0.7             AST 36      ALT 22      Anion Gap 8      eGFR 36 (*)    LACTIC ACID, PLASMA - Abnormal    Lactic Acid Level 3.1 (*)     Narrative:     Falsely low lactic acid results can be found in samples containing >=13.0 mg/dL total bilirubin and/or >=3.5 mg/dL direct bilirubin.    PHOSPHORUS - Abnormal    Phosphorus Level 2.2 (*)    URINALYSIS, REFLEX TO URINE CULTURE - Abnormal    Color, UA Orange (*)     Appearance, UA Cloudy (*)     pH, UA 7.0      Spec Grav UA 1.010      Protein, UA 2+ (*)     Glucose, UA Negative      Ketones, UA Negative      Bilirubin, UA Negative      Blood, UA 3+ (*)     Nitrites, UA Negative      Urobilinogen, UA Negative      Leukocyte Esterase, UA 3+ (*)    CBC WITH DIFFERENTIAL - Abnormal    WBC 4.15      RBC 3.13 (*)     HGB 9.3 (*)     HCT 29.7 (*)     MCV 95      MCH 29.7      MCHC 31.3 (*)     RDW 13.5      Platelet Count 101 (*)     MPV 9.9      Nucleated RBC 0      Neut % 65.1      Lymph % 16.1 (*)     Mono % 17.1 (*)     Eos % 0.7      Basophil % 0.5      Imm Grans % 0.5      Neut # 2.70      Lymph # 0.67 (*)     Mono # 0.71      Eos # 0.03      Baso # 0.02      Imm Grans # 0.02     URINALYSIS MICROSCOPIC - Abnormal    RBC, UA 85 (*)     WBC, UA >100 (*)     WBC Clumps, UA Many (*)     Bacteria, UA Many (*)     Squamous Epithelial Cells, UA 1      Hyaline Casts, UA 0      Microscopic Comment       MAGNESIUM - Normal    Magnesium  2.4     CK - Normal    CPK 68     AMMONIA - Normal    Ammonia 42     TSH - Normal    TSH 2.926     CULTURE, BLOOD   CULTURE, BLOOD   CULTURE, URINE   CBC W/ AUTO DIFFERENTIAL    Narrative:     The following orders were created for panel order CBC auto differential.  Procedure                               Abnormality         Status                     ---------                               -----------         ------                     CBC with Differential[[id number]]       Abnormal            Final result                 Please view results for these tests on  the individual orders.   PROCALCITONIN   EXTRA TUBES    Narrative:     The following orders were created for panel order EXTRA TUBES.  Procedure                               Abnormality         Status                     ---------                               -----------         ------                     Light Blue Top Hold[[id number]]                                                          Please view results for these tests on the individual orders.   LIGHT BLUE TOP HOLD   LACTIC ACID, PLASMA   LACTIC ACID, PLASMA          Imaging Results              X-Ray Chest 1 View (Final result)  Result time 07/23/25 21:26:03      Final result by Donta Arora DO (07/23/25 21:26:03)                   Impression:      No acute abnormality.      Electronically signed by: Donta Arora  Date:    07/23/2025  Time:    21:26               Narrative:    EXAMINATION:  XR CHEST 1 VIEW    CLINICAL HISTORY:  Sepsis;    TECHNIQUE:  Single frontal view of the chest was performed.    COMPARISON:  01/29/2021.    FINDINGS:  The lungs are well expanded and clear. No focal opacities are seen. The pleural spaces are clear. The cardiac silhouette is unremarkable. The visualized osseous structures demonstrate degenerative changes.  Colonic loops are noted underneath the right hemidiaphragm.                                       Medications   piperacillin-tazobactam (ZOSYN) 4.5 g in D5W 100 mL IVPB (MB+) (0 g Intravenous Stopped 7/23/25 2142)   vancomycin - pharmacy to dose (has no administration in time range)   vancomycin (VANCOCIN) 1,000 mg in 0.9% NaCl 250 mL IVPB (admixture device) (1,000 mg Intravenous New Bag 7/23/25 2152)   sodium chloride 0.9% bolus 1,233 mL 1,233 mL (1,250 mLs Intravenous New Bag 7/23/25 2026)   acetaminophen suppository 650 mg (650 mg Rectal Given 7/23/25 2132)     Medical Decision Making  Amount and/or Complexity of Data Reviewed  Labs: ordered. Decision-making details documented in ED Course.  Radiology:  "ordered. Decision-making details documented in ED Course.    Risk  OTC drugs.               ED Course as of 07/23/25 2233 Wed Jul 23, 2025 2057 Lactic Acid Level(!): 3.1 [LC]   2057 POC Lactate(!): 3.1 [LC]   2105 Phosphorus Level(!): 2.2 [LC]   2112 Hemoglobin(!): 9.3 [LC]   2112 Hematocrit(!): 29.7 [LC]   2112 WBC: 4.15 [LC]   2135 Hemoglobin(!): 9.3 [LC]   2135 Hematocrit(!): 29.7 [LC]   2213 WBC Clumps, UA(!): Many [LC]   2213 Bacteria, UA(!): Many [LC]   2213 Leukocyte Esterase, UA(!): 3+ [LC]   2213 Blood, UA(!): 3+ [LC]   2226 Case discussed with the [facility] resident.  He will admit the patient to his service for further evaluation and management of presumptive urosepsis.  Family has been updated. [LC]   2230 X-Ray Chest 1 View  No acute cardiopulmonary process per my independent interpretation.  [LC]      ED Course User Index  [LC] Stu Turk MD    Sepsis Perfusion Assessment: "I attest a sepsis perfusion exam was performed within 6 hours of sepsis, severe sepsis, or septic shock presentation, following fluid resuscitation."           Medical Decision Making:   Initial Assessment:   See HPI   Clinical Tests:   Lab Tests: Reviewed and Ordered  Radiological Study: Ordered and Reviewed  Sepsis Perfusion Assessment: "I attest a sepsis perfusion exam was performed within 6 hours of sepsis, severe sepsis, or septic shock presentation, following fluid resuscitation."  ED Management:  - patient presented with complaints of lethargy, fatigue, alterations in mental status per caregiver/sister at bedside.  Patient tachycardic, tachypneic and febrile on arrival.  Code sepsis initiated.  Chest x-ray negative.  Empiric antibiotics initiated after blood cultures.  CBC without significant leukocytosis; urinalysis consistent with an infectious process.  Patient administered rectal Tylenol for fever.  T-max of 103.1° F in the ED. will admit to the [facility] Service for urosepsis.  " Patient's caregiver/sister comfortable plan for admission at this time.                Clinical Impression:  Final diagnoses:  [A41.9] Sepsis, due to unspecified organism, unspecified whether acute organ dysfunction present (Primary)  [N39.0] Urinary tract infection without hematuria, site unspecified  [B99.9] Fever due to infection          ED Disposition Condition    Observation                         Stu Turk MD  07/23/25 1716           [1]  Social History  Tobacco Use    Smoking status: Never   Substance Use Topics    Alcohol use: No      Stu Turk MD  07/23/25 2066

## 2025-07-24 NOTE — PROGRESS NOTES
Pharmacist Renal Dose Adjustment Note    Catherine Wilkinson is a 79 y.o. male being treated with the medication zosyn    Patient Data:    Vital Signs (Most Recent):  Temp: 98.9 °F (37.2 °C) (07/24/25 0912)  Pulse: 91 (07/24/25 0602)  Resp: (!) 24 (07/24/25 0500)  BP: (!) 128/58 (07/24/25 0602)  SpO2: 97 % (07/24/25 0602) Vital Signs (72h Range):  Temp:  [98.9 °F (37.2 °C)-103.1 °F (39.5 °C)]   Pulse:  []   Resp:  [20-24]   BP: (112-142)/(56-71)   SpO2:  [95 %-100 %]      Recent Labs   Lab 07/23/25 2030   CREATININE 1.9*     Serum creatinine: 1.9 mg/dL (H) 07/23/25 2030  Estimated creatinine clearance: 18.6 mL/min (A)    Medication:Zosyn dose: 4.5g frequency q 8 hrs will be changed to medication:zosyn dose:4.5g frequency:q 12 hrs    Pharmacist's Name: Addis Bourne  Pharmacist's Extension: [phone]

## 2025-07-24 NOTE — ASSESSMENT & PLAN NOTE
Catherine Wilkinson is a 79 y.o.M with sepsis, bilateral hydronephrosis and urinary retention.    - PVR bladder scan showed >350 cc  - Uncomplicated placement of 16 Fr Pandya with immediate output of 700 cc purulent urine  - Recommend maintain Pandya 5 days  - Recommend aggressive bowel regimen  - Follow up urine and blood cultures, tailor antibiotics to cultures  - Rest of care per primary

## 2025-07-24 NOTE — PROGRESS NOTES
Pharmacokinetic Assessment Follow Up: IV Vancomycin    Vancomycin serum concentration assessment(s):    The random level was drawn correctly and can be used to guide therapy at this time. The measurement is below the desired definitive target range of 15 to 20 mcg/mL.    Vancomycin Regimen Plan:    Will give a one time dose of vancomycin 500 mg and recheck vancomycin random with AM labs on 7/25. Will continue to monitor renal function daily .    Drug levels (last 3 results):  Recent Labs   Lab Result Units 07/24/25  1210   Vancomycin Random ug/ml 10.9       Pharmacy will continue to follow and monitor vancomycin.    Please contact pharmacy at extension [phone] for questions regarding this assessment.    Thank you for the consult,   Addis Bourne       Patient brief summary:  Catherine Wilkinson is a 79 y.o. male initiated on antimicrobial therapy with IV Vancomycin for treatment of sepsis    The patient's current regimen is pulse dose    Drug Allergies:   Review of patient's allergies indicates:  No Known Allergies    Actual Body Weight:   41.1kg    Renal Function:   Estimated Creatinine Clearance: 18.6 mL/min (A) (based on SCr of 1.9 mg/dL (H)).,     Dialysis Method (if applicable):  N/A    CBC (last 72 hours):  Recent Labs   Lab Result Units 07/23/25 2030   WBC K/uL 4.15   HGB gm/dL 9.3*   HCT % 29.7*   Platelet Count K/uL 101*   Lymph % % 16.1*   Mono % % 17.1*   Eos % % 0.7   Basophil % % 0.5       Metabolic Panel (last 72 hours):  Recent Labs   Lab Result Units 07/23/25 2030 07/23/25  2140   Sodium mmol/L 140  --    Potassium mmol/L 4.6  --    Chloride mmol/L 114*  --    CO2 mmol/L 18*  --    Glucose mg/dL 115*  --    Glucose, UA   --  Negative   BUN mg/dL 34*  --    Creatinine mg/dL 1.9*  --    Albumin g/dL 2.8*  --    Bilirubin Total mg/dL 0.7  --    ALP unit/L 117  --    AST unit/L 36  --    ALT unit/L 22  --    Magnesium  mg/dL 2.4  --    Phosphorus Level mg/dL 2.2*  --        Vancomycin  Administrations:  vancomycin given in the last 96 hours                     vancomycin (VANCOCIN) 1,000 mg in 0.9% NaCl 250 mL IVPB (admixture device) (mg) 1,000 mg New Bag 07/23/25 2152                    Microbiologic Results:  Microbiology Results (last 7 days)       Procedure Component Value Units Date/Time    MRSA Screen by PCR [[id number]]     Order Status: Sent Specimen: Nasal Swab     Urine culture [[id number]] Collected: 07/23/25 2140    Order Status: Sent Specimen: Urine Updated: 07/23/25 2207    Blood culture x two cultures. Draw prior to antibiotics [[id number]] Collected: 07/23/25 2021    Order Status: Sent Specimen: Blood from Peripheral, Hand, Left Updated: 07/23/25 2030    Blood culture x two cultures. Draw prior to antibiotics [[id number]] Collected: 07/23/25 2020    Order Status: Sent Specimen: Blood from Peripheral, Forearm, Right Updated: 07/23/25 2030
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
## 2025-07-24 NOTE — PROGRESS NOTES
Pharmacist Renal Dose Adjustment Note    Catherine Wilkinson is a 79 y.o. male being treated with the medication zosyn    Patient Data:    Vital Signs (Most Recent):  Temp: (!) 101 °F (38.3 °C) (07/23/25 2227)  Pulse: 94 (07/23/25 2204)  Resp: (!) 22 (07/23/25 2204)  BP: (!) 117/56 (07/23/25 2204)  SpO2: 95 % (07/23/25 2204) Vital Signs (72h Range):  Temp:  [101 °F (38.3 °C)-103.1 °F (39.5 °C)]   Pulse:  []   Resp:  [20-24]   BP: (112-130)/(56-68)   SpO2:  [95 %-100 %]      Recent Labs   Lab 07/23/25 2030   CREATININE 1.9*     Serum creatinine: 1.9 mg/dL (H) 07/23/25 2030  Estimated creatinine clearance: 18.6 mL/min (A)    Medication:zosyn dose: 2.25g frequency 8 will be changed to medication:zosyn dose:4.5g frequency:q12    Pharmacist's Name: Olya Chatman  Pharmacist's Extension: [phone]

## 2025-07-24 NOTE — H&P
[facility] History and Physical  [facility]    Admitting Team: PANTERA Attending: Jacquelin Fisher MD   Resident: Rosa Intern: Virgilio     Date of Admit: 7/23/2025    Chief Complaint and Duration     Fever / Decreased PO intake / lethargy for 1 day.    Subjective      History of Present Illness:  Catherine Wilkinson is a 79 y.o. male with intellectual disability, chronic R hydronephrosis, hypertension who presented to [facility] on 7/23/2025 for     The patient was in their usual state of health which is wheelchair-bound at baseline requiring assistance with all activities of daily living per caregiver sister due to baseline intellectual disability until 1 day prior to admission when sister reports onset of decreased p.o. intake, lethargy, not himself.    History obtained via collateral sister who is patient's primary caregiver.  Chief complaint and timeline as above.  Sister reports that patient has been acting a little slow for the last 24-48 hours in his not been eating or drinking as frequently as usual.  Sister reports urine has been cloudy and foul-smelling over this time. Otherwise, she reports that the patient himself does not have any specific complaints.  She denies any significant changes to mentation which is states that patient has been overall slower than usual.  On my evaluation, sister reports that his mentation is nearing approximate baseline.  Patient's baseline mentation is alert and oriented times 0 however he is able to follow simple commands.  His response to questioning is grunting which sister states is normal for him.  Sister reports similar symptoms with previous hospitalization in 2023 for which patient presented with abdominal pain and foul-smelling urine - CT abdomen and pelvis at that time consistent with chronic hydronephrosis with plans for outpatient evaluation by urology service.  Patient was not able to make urology appointment and was ultimately lost to follow up.   "Patient's sister denies any episodes of nausea/vomiting, abdominal pain, shortness of breath/difficulty breathing, new rashes or wounds, headache, neck stiffness, melena/hematochezia, hematuria.    Patient himself is unable to participate in review of systems due to baseline mentation.    Review of Systems:  All other systems reviewed and negative    Past Medical History:  Intellectual disability  Hypertension  Chronic right-sided hydronephrosis    Past Surgical History:  Past Surgical History:   Procedure Laterality Date    AMPUTATION         Allergies:  Review of patient's allergies indicates:  No Known Allergies    Home Medications:  Olmesartan 40mg daily  Amlodipine 5mg daily    Family History:  No family history on file.    Social History:  Tobacco:   Tobacco Use: Unknown (11/2/2021)    Patient History     Smoking Tobacco Use: Never     Smokeless Tobacco Use: Unknown     Passive Exposure: Not on file     EtOH:   Alcohol Use: Not on file     Illicits:   Social History     Substance and Sexual Activity   Drug Use Not on file      Occupation: Data Unavailable.      Health Maintaince :   Primary Care Physician: Eitan Dhaliwal MD    Immunizations:   Currently on File within the [facility]:   Most Recent Immunizations   Administered Date(s) Administered    COVID-19, MRNA, LN-S, PF (Pfizer) (Purple Cap) 04/02/2021    Influenza (FLUAD) - Quadrivalent - Adjuvanted - PF *Preferred* (65+) 11/01/2023     Cancer Screening:  Colonoscopy:  [] needs     Objective     Physical Examination:    Triage: BP: 112/68  Pulse: 100  Temp: (!) 102.3 °F (39.1 °C)  Resp: 20  Height: 5' 5.98" (167.6 cm)  Weight: 41.1 kg (90 lb 9.8 oz)  BMI (Calculated): 14.6  SpO2: 100 %  Exam: BP (!) 117/56   Pulse 94   Temp (!) 101 °F (38.3 °C) (Rectal)   Resp (!) 22   Ht 5' 5.98" (1.676 m)   Wt 41.1 kg (90 lb 9.8 oz)   SpO2 95%   BMI 14.63 kg/m²   Body mass index is 14.63 kg/m².     Constitutional:  Alert, moving extremities " spontaneously, interactive  HEENT: Sclera anicteric, Pupils equal, round and reactive to light, extraocular motions intact. Dry mucous membranes   Neck: No JVD, no carotid bruits  Cardiovascular:  Tachycardic, regular rhythm  Pulmonary: Clear to auscultation bilaterally  Abdominal: Abdomen taught, not distended  Skin: No ecchymosis, erythema, or ulcers  Neuro/Psych:  Patient unable to participate in neurologic exam however is moving his extremities spontaneously.  Alert and oriented times 0.  Groans in response to questioning.  Near approximate baseline per caregiver    Laboratory:  Lab Results   Component Value Date    WBC 4.15 07/23/2025    HGB 9.3 (L) 07/23/2025    MCV 95 07/23/2025    MCH 29.7 07/23/2025    MCHC 31.3 (L) 07/23/2025    RDW 13.5 07/23/2025     (L) 07/23/2025    MPV 9.9 07/23/2025     Lab Results   Component Value Date    CREATININE 1.9 (H) 07/23/2025    BUN 34 (H) 07/23/2025     07/23/2025    K 4.6 07/23/2025     (H) 07/23/2025    CO2 18 (L) 07/23/2025     All laboratory data reviewed    Microbiology Data:  Microbiology Results (last 7 days)       Procedure Component Value Units Date/Time    MRSA Screen by PCR [[id number]]     Order Status: Sent Specimen: Nasal Swab     Urine culture [[id number]] Collected: 07/23/25 2140    Order Status: Sent Specimen: Urine Updated: 07/23/25 2207    Blood culture x two cultures. Draw prior to antibiotics [[id number]] Collected: 07/23/25 2021    Order Status: Sent Specimen: Blood from Peripheral, Hand, Left Updated: 07/23/25 2030    Blood culture x two cultures. Draw prior to antibiotics [[id number]] Collected: 07/23/25 2020    Order Status: Sent Specimen: Blood from Peripheral, Forearm, Right Updated: 07/23/25 2030            EKG:  Results for orders placed or performed during the hospital encounter of 11/01/23   EKG 12-lead    Collection Time: 11/01/23  6:00 PM    Narrative    Test Reason : N39.0,    Vent. Rate : 093 BPM     Atrial Rate : 093  BPM     P-R Int : 124 ms          QRS Dur : 068 ms      QT Int : 336 ms       P-R-T Axes : 069 -04 052 degrees     QTc Int : 417 ms    Normal sinus rhythm  Normal ECG  When compared with ECG of 29-JAN-2021 16:47,  ST no longer elevated in Inferior leads  T wave inversion no longer evident in Inferior leads  T wave amplitude has increased in Anterior leads  Confirmed by Kal Yousif MD ([id number]) on 11/2/2023 6:00:05 PM    Referred By: [email or web address]   SELF           Confirmed By:Kal Yousif MD        I have personally reviewed the above EKG    Radiology:  X-Ray Chest 1 View   Final Result      No acute abnormality.         Electronically signed by: Donta Arora   Date:    07/23/2025   Time:    21:26      CT Renal Stone Study Abd Pelvis WO    (Results Pending)        I have personally reviewed the above imaging    Assessment/Plan     Catherine Wilkinson is a 79 y.o. male with intellectual disability, chronic right-sided hydronephrosis, hypertension who presented to [facility] on 7/23/2025 for fever/decreased po intake/lethargy [id number].  In the ED, patient tachycardic to low 100s with soft blood pressures and fever with T-max of 103.1°.  Physical exam largely unrevealing - baseline per caregiver. Labs remarkable for stage I acute kidney injury with non anion gap metabolic acidosis, lactic acid 3.1.  Urinalysis notable for greater than 100 WBCs, many bacteria, leuk esterase, 2+ protein, 3+ blood. Patient is admitted to [facility] for sepsis secondary to urinary tract infection on the background of known chronic right-sided hydronephrosis.     Sepsis with Acute Organ Dysfunction  Complicated Urinary Tract Infection  Acute Metabolic Encephalopathy   - Source: Urine  - UA: >100WBCs, many bacteria, leuk esterase, 3+ blood   - End Organ dysfunction: CASIE, encephalopathy  - SIRS 3/4, qSOFA 2/3, MEWS 4, Gestalt +  - Continue Vanc / Zosyn renally dosed   - Continue volume resuscitation   - Trend lactate  - CT AP w/o  - Urology eval in  AM  - Holding antipyretics. If febrile despite abx regimen, will broaden to cover meningitis though suspicion currently low as mentation improved per caregiver.   - De-escalate pending culture data. Has not grown resistant organisms in the past. Check MRSA nares.     Stage 1 Acute Kidney Injury on Chronic Kidney Disease Stage 2  Chronic R Sided Hydronephrosis   - Baseline Cr 1.0, GFR >60 now 1.9, 36 respectively   - Suspect 2/2 distributive volume loss iso sepsis on background of known R sided hydronephrosis   - Continue volume resuscitation   - CT A/P w/o  - Strict I/Os  - Renally dose medication  - Urology eval in AM    Non-Anion Gap Metabolic Acidosis   Anion Gap Metabolic Acidosis   - AG 8, HCO3- 18, LA 3.1  - Mixed gap/non-gap  - No diarrhea   - Trend LA, continue IVF  - Daily BMP, if no improvement consider UAG    Normocytic Anemia   - Hgb 9.3, MCV 95  - No recent bleeding/melena   - Not UTD with age appropriate Ca screening   - Check Fe studies, B12, B9     Thrombocytopenia   - 109 on admit, relatively chronic near baseline   - CTM while inpatient     Hypertension   - Holding home antihypertensives     Intellectual Disability  - Unifying diagnosis not available in chart  - Has had issues since birth per sister  - Patient with contractions of extremities and abdominal wall at baseline. CP?  - Near baseline per caregiver     Diet: NPO  Code: Full  Dispo: Floor. Tele. Pending improved hemodynamics, renal function, urology evaluation.  Estimated LOS: 2-3 days    Mega Young MD  [facility] PGY-III    VTE Risk Mitigation (From admission, onward)           Ordered     enoxaparin injection 30 mg  Daily         07/23/25 2259     IP VTE HIGH RISK PATIENT  Once         07/23/25 2259     Place sequential compression device  Until discontinued         07/23/25 2259

## 2025-07-24 NOTE — SUBJECTIVE & OBJECTIVE
Past Medical History:   Diagnosis Date    Hypertension     MR (mental retardation)        Past Surgical History:   Procedure Laterality Date    AMPUTATION         Review of patient's allergies indicates:  No Known Allergies    Family History    None         Tobacco Use    Smoking status: Never    Smokeless tobacco: Not on file   Substance and Sexual Activity    Alcohol use: No    Drug use: Not on file    Sexual activity: Not on file       Review of Systems   Unable to perform ROS: Psychiatric disorder       Objective:     Temp:  [98.9 °F (37.2 °C)-103.1 °F (39.5 °C)] 98.9 °F (37.2 °C)  Pulse:  [] 91  Resp:  [20-24] 24  SpO2:  [95 %-100 %] 97 %  BP: (112-142)/(56-71) 128/58  Weight: 41.1 kg (90 lb 9.8 oz)  Body mass index is 14.63 kg/m².           Drains       Drain  Duration             Male External Urinary Catheter 07/23/25 2030 <1 day                     Physical Exam  Constitutional:       General: He is not in acute distress.     Appearance: He is ill-appearing.   HENT:      Head: Normocephalic and atraumatic.   Cardiovascular:      Rate and Rhythm: Normal rate.   Pulmonary:      Effort: Pulmonary effort is normal.   Abdominal:      Palpations: Abdomen is soft.   Genitourinary:     Comments: Uncircumcised phallus with orthotopic meatus. Foreskin is easily retracted.  Musculoskeletal:      Cervical back: Normal range of motion.      Comments: Well healed R AKA   Skin:     General: Skin is warm and dry.          Significant Labs:    BMP:  Recent Labs   Lab 07/23/25 2030      K 4.6   *   CO2 18*   BUN 34*   CREATININE 1.9*   CALCIUM 8.8       CBC:  Recent Labs   Lab 07/23/25 2030   WBC 4.15   HGB 9.3*   HCT 29.7*   *       Urine Studies:   Recent Labs   Lab 07/23/25  2140   COLORU [place]*   APPEARANCEUA Cloudy*   PHUR 7.0   SPECGRAV 1.010   PROTEINUA 2+*   GLUCUA Negative   BILIRUBINUA Negative   OCCULTUA 3+*   NITRITE Negative   UROBILINOGEN Negative   LEUKOCYTESUR 3+*   RBCUA 85*    WBCUA >100*   BACTERIA Many*   HYALINECASTS 0     All pertinent labs results from the past 24 hours have been reviewed.    Significant Imaging:  All pertinent imaging results/findings from the past 24 hours have been reviewed.

## 2025-07-24 NOTE — NURSING
VIRTUAL NURSE: Permission received per sister to turn camera. VIP model explained;  informed this VN will be working with the nurse and the rest of the care team. Plan of care reviewed with sister.   Educated patient on VTE, fall risk and fall risk precautions in place. Admission questions completed. Instucted to ask staff for assistance.  Initiated plan of care.

## 2025-07-24 NOTE — ED NOTES
Pt to ER via EMS per caregiver request.  States pt febrile and fatigued with onset today.  Denies SOB, N/V/D.  Pt with mental disability and unable to speak at baseline.  Caregiver states did not administer meds to treat fever PTA.  ERP at bedside on arrival

## 2025-07-24 NOTE — ASSESSMENT & PLAN NOTE
- Right hydronephrosis is chronic with severe parenchymal thinning on that side. Kidney likely non-functioning.  - Left hydronephrosis likely secondary to urinary retention which may be somewhat chronic in nature given formation of bladder stones.   - Will need repeat imaging following discharge to assess resolution of hydronephrosis.

## 2025-07-24 NOTE — PHARMACY MED REC
"  [facility]           Pharmacy  Admission Medication History     The home medication history was taken by Anjana Graham.      Medication history obtained from Medications listed below were obtained from: Patient/family    Based on information gathered for medication list, you may go to "Admission" then "Reconcile Home Medications" tabs to review and/or act upon those items.     The home medication list has been updated by the Pharmacy department.   Please read ALL comments highlighted in yellow.   Please address this information as you see fit.    Feel free to contact us if you have any questions or require assistance.    The medications listed below were removed from the home medication list.  Please reorder if appropriate:    Patient reports NOT TAKING the following medication(s):  Lotrimin soln  Mycostatin oint  Altace 10mg  Kenalog cream      No current facility-administered medications on file prior to encounter.     Current Outpatient Medications on File Prior to Encounter   Medication Sig Dispense Refill    amLODIPine (NORVASC) 10 MG tablet Take 10 mg by mouth once daily.      olmesartan (BENICAR) 40 MG tablet Take 1 tablet (40 mg total) by mouth once daily. 90 tablet 1       Please address this information as you see fit.  Feel free to contact us if you have any questions or require assistance.    Anjana Graham  [phone]                .          "

## 2025-07-24 NOTE — PLAN OF CARE
Problem: SLP  Goal: SLP Goal  Description: Short Term Goals:  1. Pt will participate in a clinical swallow eval to determine least restrictive diet. - MET  Outcome: Met    Pt participated in ST swallow eval. Pt with an intellectual disability with baseline cognitive-linguistic deficits. Pt nonverbal and did not follow commands.  Pt's sister at bedside who answered SLP question re: diet and swallowing at home in which she reported he consumed soft foods at home without difficulty. Pt is safe to initiate SOFT AND BITE-SIZED diet with THIN liquids following standard aspiration precautions:   -meds- whole 1 at a time  -1:1 assist with all po intake  -SMALL bites/sips  -alternate bites/sips  -1 bite/sip at a time  -HOB to 90 degrees during all po intake  -remain upright for 30 min s/p meals

## 2025-07-24 NOTE — PLAN OF CARE
Problem: Adult Inpatient Plan of Care  Goal: Plan of Care Review  Outcome: Progressing  Goal: Optimal Comfort and Wellbeing  Outcome: Progressing     Problem: Sepsis/Septic Shock  Goal: Optimal Coping  Outcome: Progressing     Problem: UTI (Urinary Tract Infection)  Goal: Improved Infection Symptoms  Outcome: Progressing     Problem: Fall Injury Risk  Goal: Absence of Fall and Fall-Related Injury  Outcome: Progressing

## 2025-07-25 PROBLEM — B96.20 BACTEREMIA DUE TO ESCHERICHIA COLI: Status: ACTIVE | Noted: 2025-07-25

## 2025-07-25 PROBLEM — N32.0 BLADDER-NECK OBSTRUCTION: Status: ACTIVE | Noted: 2025-07-25

## 2025-07-25 PROBLEM — R78.81 BACTEREMIA DUE TO ESCHERICHIA COLI: Status: ACTIVE | Noted: 2025-07-25

## 2025-07-25 LAB
ABSOLUTE EOSINOPHIL (OHS): 0.1 K/UL
ABSOLUTE MONOCYTE (OHS): 0.65 K/UL (ref 0.3–1)
ABSOLUTE NEUTROPHIL COUNT (OHS): 3.22 K/UL (ref 1.8–7.7)
ALBUMIN SERPL BCP-MCNC: 2.3 G/DL (ref 3.5–5.2)
ALP SERPL-CCNC: 87 UNIT/L (ref 40–150)
ALT SERPL W/O P-5'-P-CCNC: 18 UNIT/L (ref 10–44)
ANION GAP (OHS): 7 MMOL/L (ref 8–16)
AST SERPL-CCNC: 36 UNIT/L (ref 11–45)
BASOPHILS # BLD AUTO: 0.02 K/UL
BASOPHILS NFR BLD AUTO: 0.4 %
BILIRUB SERPL-MCNC: 0.7 MG/DL (ref 0.1–1)
BUN SERPL-MCNC: 18 MG/DL (ref 8–23)
CALCIUM SERPL-MCNC: 8.5 MG/DL (ref 8.7–10.5)
CHLORIDE SERPL-SCNC: 118 MMOL/L (ref 95–110)
CO2 SERPL-SCNC: 22 MMOL/L (ref 23–29)
CREAT SERPL-MCNC: 1.4 MG/DL (ref 0.5–1.4)
ERYTHROCYTE [DISTWIDTH] IN BLOOD BY AUTOMATED COUNT: 13.2 % (ref 11.5–14.5)
GFR SERPLBLD CREATININE-BSD FMLA CKD-EPI: 51 ML/MIN/1.73/M2
GLUCOSE SERPL-MCNC: 87 MG/DL (ref 70–110)
HCT VFR BLD AUTO: 29.8 % (ref 40–54)
HGB BLD-MCNC: 9.4 GM/DL (ref 14–18)
IMM GRANULOCYTES # BLD AUTO: 0.02 K/UL (ref 0–0.04)
IMM GRANULOCYTES NFR BLD AUTO: 0.4 % (ref 0–0.5)
LYMPHOCYTES # BLD AUTO: 0.86 K/UL (ref 1–4.8)
MAGNESIUM SERPL-MCNC: 2.1 MG/DL (ref 1.6–2.6)
MCH RBC QN AUTO: 29.1 PG (ref 27–31)
MCHC RBC AUTO-ENTMCNC: 31.5 G/DL (ref 32–36)
MCV RBC AUTO: 92 FL (ref 82–98)
NUCLEATED RBC (/100WBC) (OHS): 0 /100 WBC
PHOSPHATE SERPL-MCNC: 2.2 MG/DL (ref 2.7–4.5)
PLATELET # BLD AUTO: 106 K/UL (ref 150–450)
PMV BLD AUTO: 9.7 FL (ref 9.2–12.9)
POTASSIUM SERPL-SCNC: 4 MMOL/L (ref 3.5–5.1)
PROT SERPL-MCNC: 6.4 GM/DL (ref 6–8.4)
RBC # BLD AUTO: 3.23 M/UL (ref 4.6–6.2)
RELATIVE EOSINOPHIL (OHS): 2.1 %
RELATIVE LYMPHOCYTE (OHS): 17.7 % (ref 18–48)
RELATIVE MONOCYTE (OHS): 13.3 % (ref 4–15)
RELATIVE NEUTROPHIL (OHS): 66.1 % (ref 38–73)
SODIUM SERPL-SCNC: 147 MMOL/L (ref 136–145)
WBC # BLD AUTO: 4.87 K/UL (ref 3.9–12.7)

## 2025-07-25 PROCEDURE — 83735 ASSAY OF MAGNESIUM: CPT

## 2025-07-25 PROCEDURE — 87040 BLOOD CULTURE FOR BACTERIA: CPT

## 2025-07-25 PROCEDURE — 21400001 HC TELEMETRY ROOM

## 2025-07-25 PROCEDURE — 85025 COMPLETE CBC W/AUTO DIFF WBC: CPT

## 2025-07-25 PROCEDURE — 84100 ASSAY OF PHOSPHORUS: CPT

## 2025-07-25 PROCEDURE — 63600175 PHARM REV CODE 636 W HCPCS: Performed by: INTERNAL MEDICINE

## 2025-07-25 PROCEDURE — 25000003 PHARM REV CODE 250: Performed by: INTERNAL MEDICINE

## 2025-07-25 PROCEDURE — 63600175 PHARM REV CODE 636 W HCPCS

## 2025-07-25 PROCEDURE — 25000003 PHARM REV CODE 250

## 2025-07-25 PROCEDURE — 99232 SBSQ HOSP IP/OBS MODERATE 35: CPT | Mod: ,,, | Performed by: UROLOGY

## 2025-07-25 PROCEDURE — 36415 COLL VENOUS BLD VENIPUNCTURE: CPT

## 2025-07-25 PROCEDURE — 80053 COMPREHEN METABOLIC PANEL: CPT

## 2025-07-25 RX ORDER — MUPIROCIN 20 MG/G
OINTMENT TOPICAL 2 TIMES DAILY
Status: DISCONTINUED | OUTPATIENT
Start: 2025-07-25 | End: 2025-07-27 | Stop reason: HOSPADM

## 2025-07-25 RX ADMIN — MUPIROCIN: 20 OINTMENT TOPICAL at 02:07

## 2025-07-25 RX ADMIN — ENOXAPARIN SODIUM 30 MG: 30 INJECTION SUBCUTANEOUS at 06:07

## 2025-07-25 RX ADMIN — PIPERACILLIN SODIUM AND TAZOBACTAM SODIUM 4.5 G: 4; .5 INJECTION, POWDER, LYOPHILIZED, FOR SOLUTION INTRAVENOUS at 08:07

## 2025-07-25 RX ADMIN — MUPIROCIN: 20 OINTMENT TOPICAL at 08:07

## 2025-07-25 RX ADMIN — SENNOSIDES 8.6 MG: 8.6 TABLET, FILM COATED ORAL at 09:07

## 2025-07-25 RX ADMIN — PIPERACILLIN SODIUM AND TAZOBACTAM SODIUM 4.5 G: 4; .5 INJECTION, POWDER, LYOPHILIZED, FOR SOLUTION INTRAVENOUS at 12:07

## 2025-07-25 RX ADMIN — POLYETHYLENE GLYCOL 3350 17 G: 17 POWDER, FOR SOLUTION ORAL at 09:07
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
## 2025-07-25 NOTE — ASSESSMENT & PLAN NOTE
- Right hydronephrosis is chronic with severe parenchymal thinning on that side. Kidney likely non-functioning.  - Left hydronephrosis likely secondary to urinary retention which may be somewhat chronic in nature given formation of bladder stones.   - Follow up 2 weeks with Urology [facility] with renal ultrasound to assess hydronephrosis prior to voiding trial.

## 2025-07-25 NOTE — PLAN OF CARE
SOCIAL WORK DISCHARGE PLANNING ASSESSMENT     completed discharge planning assessment with pt and pt's sister Mariana ([phone]). Pt did not say anything during the assessment but Mariana was easily engaged and education on the role of  was provided. Mariana stated pt lives with their sister Florinda ([phone]). Mariana reported pt has good support from family and friends and advised pt's sisters will provide assistance as needed after returning home. Mariana stated pt has the following DME: rw and wc. Mariana reported pt is not current with HH services but they are open to having HH services upon discharge. Mariana stated pt does not drive, that Florinda drives pt to doctor appointments and Florinda will provide transportation home following discharge. Pt and Mariana were encouraged to call with any questions or concerns. Pt and Mariana verbalized understanding.      07/25/25 0772   Discharge Assessment   Assessment Type Discharge Planning Assessment   Confirmed/corrected address, phone number and insurance Yes   Confirmed Demographics Correct on Facesheet   Source of Information patient;family   Communicated ELAINE with patient/caregiver Yes   People in Home sibling(s)   Facility Arrived From: home   Do you expect to return to your current living situation? Yes   Do you have help at home or someone to help you manage your care at home? Yes   Who are your caregiver(s) and their phone number(s)? pt's sisters   Walking or Climbing Stairs Difficulty yes   Walking or Climbing Stairs ambulation difficulty, requires equipment   Dressing/Bathing Difficulty no   Home Accessibility wheelchair accessible   Home Layout Able to live on 1st floor   Equipment Currently Used at Home walker, rolling;wheelchair   Readmission within 30 days? No   Patient currently being followed by outpatient case management? No   Do you currently have service(s) that help you manage your care at home? No   Do you take prescription medications? Yes   Do you  have prescription coverage? Yes   Do you have any problems affording any of your prescribed medications? No   Is the patient taking medications as prescribed? yes   Who is going to help you get home at discharge? pt's sisters   How do you get to doctors appointments? family or friend will provide   Are you on dialysis? No   Do you take coumadin? No   Discharge Plan A Home with family   Discharge Plan B Home Health   DME Needed Upon Discharge    (TBD)   Discharge Plan discussed with: Sibling   Transition of Care Barriers None   Physical Activity   On average, how many days per week do you engage in moderate to strenuous exercise (like a brisk walk)? Pt Unable   On average, how many minutes do you engage in exercise at this level? Pt Unable   Financial Resource Strain   How hard is it for you to pay for the very basics like food, housing, medical care, and heating? Pt Unable   Housing Stability   In the last 12 months, was there a time when you were not able to pay the mortgage or rent on time? Pt Unable   At any time in the past 12 months, were you homeless or living in a shelter (including now)? Pt Unable   Transportation Needs   In the past 12 months, has lack of transportation kept you from medical appointments or from getting medications? Pt Unable   In the past 12 months, has lack of transportation kept you from meetings, work, or from getting things needed for daily living? Pt Unable   Food Insecurity   Within the past 12 months, you worried that your food would run out before you got the money to buy more. Pt Unable   Within the past 12 months, the food you bought just didn't last and you didn't have money to get more. Pt Unable   Stress   Do you feel stress - tense, restless, nervous, or anxious, or unable to sleep at night because your mind is troubled all the time - these days? Pt Unable   Social Isolation   How often do you feel lonely or isolated from those around you?  Patient unable to answer   Alcohol  Use   Q1: How often do you have a drink containing alcohol? Pt Unable   Q2: How many drinks containing alcohol do you have on a typical day when you are drinking? Pt Unable   Q3: How often do you have six or more drinks on one occasion? Pt Unable   Utilities   In the past 12 months has the electric, gas, oil, or water company threatened to shut off services in your home? Pt Unable   Health Literacy   How often do you need to have someone help you when you read instructions, pamphlets, or other written material from your doctor or pharmacy? Patient unable to respond

## 2025-07-25 NOTE — PROGRESS NOTES
[facility] Progress Note      Patient Name: Catherine Wilkinson  MRN: [id number]  Admission Date: 7/23/2025  Hospital Length of Stay: 2 days  Attending Provider: Jacquelin Fisher MD  Primary Care Provider: Eitan Dhaliwal MD  Principal Problem: Sepsis with acute organ dysfunction without septic shock     SUBJECTIVE/INTERVAL HISTORY:       Care givers at bedside this am. Afebrile over >24 hours. Blood cx positive for Ecoli pending susceptibilities. No complaints overnight per family. Has had 1 bowel movement overnight.     MEDICATIONS:      Scheduled Medications    enoxparin  30 mg Subcutaneous Daily    piperacillin-tazobactam (Zosyn) IV (PEDS and ADULTS) (extended infusion is not appropriate)  4.5 g Intravenous Q12H    polyethylene glycol  17 g Oral Daily    senna  8.6 mg Oral Daily      PRN Medications     Current Facility-Administered Medications:     dextrose 50%, 12.5 g, Intravenous, PRN    dextrose 50%, 25 g, Intravenous, PRN    glucagon (human recombinant), 1 mg, Intramuscular, PRN    glucose, 16 g, Oral, PRN    glucose, 24 g, Oral, PRN    naloxone, 0.02 mg, Intravenous, PRN    sodium chloride 0.9%, 10 mL, Intravenous, Q12H PRN  IV Fluids               OBJECTIVE DATA:      Weight  Weight: 40.3 kg (88 lb 13.5 oz)  Body mass index is 16.25 kg/m².    Latest Vitals Vital range last 24 hours   Temp: 97.8 °F (36.6 °C) (07/25/25 0707)  Pulse: 73 (07/25/25 0707)  Resp: 18 (07/25/25 0707)  BP: 137/70 (07/25/25 0707)  SpO2: 96 % (07/25/25 0707) Temp:  [96.9 °F (36.1 °C)-98.9 °F (37.2 °C)] 97.8 °F (36.6 °C)  Pulse:  [71-84] 73  Resp:  [17-18] 18  SpO2:  [95 %-100 %] 96 %  BP: (116-137)/(48-75) 137/70     In/out Last 24 hours In/Out Since Admission     Intake/Output Summary (Last 24 hours) at 7/25/2025 0755  Last data filed at 7/25/2025 0523  Gross per 24 hour   Intake --   Output 4101 ml   Net -4101 ml    Net IO Since Admission: -3,268 mL [07/25/25 0741]       Physical Exam  Constitutional:  Alert, moving extremities spontaneously,  "interactive  HEENT: Sclera anicteric, Pupils equal, round and reactive to light, extraocular motions intact. Dry mucous membranes   Neck: No JVD, no carotid bruits, no nuchal rigidity or photophobia.   Cardiovascular:  regular rate, regular rhythm  Pulmonary: Clear to auscultation bilaterally  Abdominal: Abdomen taught, not distended, does not wince to palpation.  Skin: No ecchymosis, erythema, or ulcers  : chinchilla in place  Neuro/Psych:  Patient unable to participate in neurologic exam however is moving his extremities spontaneously.  Alert and oriented times 0.  Groans in response to questioning.  Near approximate baseline per caregiver      Labs    Recent Labs   Lab 07/23/25 2030   MG 2.4   PHOS 2.2*       No results for input(s): "TROPONINI", "BNP" in the last 168 hours.    Glucose Trends  Recent Labs   Lab 07/23/25 2030   *       Last Urinalysis  Recent Labs   Lab 07/23/25 2140   COLORU [place]*   APPEARANCEUA Cloudy*   SPECGRAV 1.010   PHUR 7.0   PROTEINUA 2+*   OCCULTUA 3+*   NITRITE Negative   UROBILINOGEN Negative   BILIRUBINUA Negative   LEUKOCYTESUR 3+*   RBCUA 85*   WBCUA >100*   BACTERIA Many*   HYALINECASTS 0        Microbiology  Microbiology Results (last 7 days)       Procedure Component Value Units Date/Time    Rapid Organism ID by PCR (from Blood culture) [[id number]] Collected: 07/23/25 2021    Order Status: Canceled Specimen: Blood from Peripheral, Hand, Left Updated: 07/25/25 0244    Blood culture x two cultures. Draw prior to antibiotics [[id number]]  (Abnormal) Collected: 07/23/25 2021    Order Status: Completed Specimen: Blood from Peripheral, Hand, Left Updated: 07/25/25 0244     Blood Culture Positive - Anaerobic Bottle     GRAM STAIN Gram Negative Rods     Comment: Anaerobic Bottle Positive        Rapid Organism ID by PCR (from Blood culture) [[id number]]  (Abnormal) Collected: 07/23/25 2020    Order Status: Completed Specimen: Blood from Peripheral, Forearm, Right Updated: " 07/24/25 2141     Enterococcus faecalis Not Detected     Enterococcus faecium Not Detected     Listeria monocytogenes Not Detected     Staphylococcus spp. Not Detected     Staphylococcus aureus Not Detected     Staphylococcus epidermidis Not Detected     Staphylococcus lugdunensis Not Detected     Streptococcus spp. Not Detected     Streptococcus agalactiae (Group B) Not Detected     Streptococcus pneumoniae Not Detected     Streptococcus pyogenes (Group A) Not Detected     Acinetobacter calcoaceticus/baumannii complex Not Detected     Bacteroides fragilis Not Detected     Enterobacterales See Species for ID     Enterobacter cloacae complex Not Detected     Escherichia coli Detected     Klebsiella aerogenes Not Detected     Klebsiella oxytoca Not Detected     Klebsiella pneumoniae group Not Detected     Proteus spp. Not Detected     Salmonella spp. Not Detected     Serratia marcescens Not Detected     Haemophilus influenzae Not Detected     Neisseria meningitidis Not Detected     Pseudomonas aeruginosa Not Detected     Stenotrophomonas maltophilia Not Detected     Candida albicans Not Detected     Candida auris Not Detected     Candida glabrata Not Detected     Candida krusei Not Detected     Candida parapsilosis Not Detected     Candida tropicalis Not Detected     Cryptococcus neoformans/gattii Not Detected     CTX-M (ESBL ) Not Detected     Comment: Note: Antimicrobial resistance can occur via multiple mechanisms. A Not Detected result for antimicrobial resistance gene(s) does not indicate antimicrobial susceptibility. Subculturing is required for species identification and susceptibility testing of   isolates.        IMP (Cabapenemase ) Not Detected     Comment: Note: Antimicrobial resistance can occur via multiple mechanisms. A Not Detected result for antimicrobial resistance gene(s) does not indicate antimicrobial susceptibility. Subculturing is required for species identification and  susceptibility testing of   isolates.        KPC resistance gene (Carbapenemase ) Not Detected     Comment: Note: Antimicrobial resistance can occur via multiple mechanisms. A Not Detected result for antimicrobial resistance gene(s) does not indicate antimicrobial susceptibility. Subculturing is required for species identification and susceptibility testing of   isolates.        mcr-1 Not Detected     Comment: Note: Antimicrobial resistance can occur via multiple mechanisms. A Not Detected result for antimicrobial resistance gene(s) does not indicate antimicrobial susceptibility. Subculturing is required for species identification and susceptibility testing of   isolates.        mecA ID N/A     Comment: Note: Antimicrobial resistance can occur via multiple mechanisms. A Not Detected result for antimicrobial resistance gene(s) does not indicate antimicrobial susceptibility. Subculturing is required for species identification and susceptibility testing of   isolates.        mecA/C and MREJ (MRSA) gene N/A     Comment: Note: Antimicrobial resistance can occur via multiple mechanisms. A Not Detected result for antimicrobial resistance gene(s) does not indicate antimicrobial susceptibility. Subculturing is required for species identification and susceptibility testing of   isolates.        NDM (Carbapenemase ) Not Detected     Comment: Note: Antimicrobial resistance can occur via multiple mechanisms. A Not Detected result for antimicrobial resistance gene(s) does not indicate antimicrobial susceptibility. Subculturing is required for species identification and susceptibility testing of   isolates.        OXA-48-like (Carbapenemase ) Not Detected     Comment: Note: Antimicrobial resistance can occur via multiple mechanisms. A Not Detected result for antimicrobial resistance gene(s) does not indicate antimicrobial susceptibility. Subculturing is required for species identification and susceptibility  testing of   isolates.        Esther/B (VRE gene) N/A     Comment: Note: Antimicrobial resistance can occur via multiple mechanisms. A Not Detected result for antimicrobial resistance gene(s) does not indicate antimicrobial susceptibility. Subculturing is required for species identification and susceptibility testing of   isolates.        VIM (Carbapenemase ) Not Detected     Comment: Note: Antimicrobial resistance can occur via multiple mechanisms. A Not Detected result for antimicrobial resistance gene(s) does not indicate antimicrobial susceptibility. Subculturing is required for species identification and susceptibility testing of   isolates.       Blood culture x two cultures. Draw prior to antibiotics [[id number]]  (Abnormal) Collected: 07/23/25 2020    Order Status: Completed Specimen: Blood from Peripheral, Forearm, Right Updated: 07/24/25 2021     GRAM STAIN Gram Negative Rods     Comment: Anaerobic Bottle Positive        MRSA Screen by PCR [[id number]]     Order Status: Sent Specimen: Nasal Swab     Urine culture [[id number]] Collected: 07/23/25 2140    Order Status: Sent Specimen: Urine Updated: 07/23/25 2207          Antibiotics (From admission, onward)      Start     Stop Route Frequency Ordered    07/24/25 1115  piperacillin-tazobactam (ZOSYN) 4.5 g in D5W 100 mL IVPB (MB+)         -- IV Every 12 hours (non-standard times) 07/24/25 1012             EKG  Results for orders placed or performed during the hospital encounter of 11/01/23   EKG 12-lead    Collection Time: 11/01/23  6:00 PM    Narrative    Test Reason : N39.0,    Vent. Rate : 093 BPM     Atrial Rate : 093 BPM     P-R Int : 124 ms          QRS Dur : 068 ms      QT Int : 336 ms       P-R-T Axes : 069 -04 052 degrees     QTc Int : 417 ms    Normal sinus rhythm  Normal ECG  When compared with ECG of 29-JAN-2021 16:47,  ST no longer elevated in Inferior leads  T wave inversion no longer evident in Inferior leads  T wave amplitude has  increased in Anterior leads  Confirmed by Kal Yousif MD ([id number]) on 11/2/2023 6:00:05 PM    Referred By: [email or web address]   SELF           Confirmed By:Kal Yousif MD        I have personally reviewed the above EKG    Radiology  CT Renal Stone Study Abd Pelvis WO   Final Result      Moderate urinary bladder distension with severe right, moderate left hydronephrosis.  Findings may be due to bladder outlet obstruction.      Large volume of stool in the rectum, correlate for constipation/fecal impaction.      Several urinary bladder calculi.         Electronically signed by: Donta Arora   Date:    07/24/2025   Time:    00:24      X-Ray Chest 1 View   Final Result      No acute abnormality.         Electronically signed by: Donta Arora   Date:    07/23/2025   Time:    21:26           I have personally reviewed the above imaging      Assessment/Plan:     Catherine Wilkinson is a 79 y.o. male with intellectual disability, chronic right-sided hydronephrosis, hypertension who [facility] to [facility] for fever/decreased po intake/lethargy [id number].  In the ED, patient tachycardic to low 100s with soft blood pressures and fever with T-max of 103.1°.  Physical exam largely unrevealing - baseline per caregiver. Labs remarkable for stage I acute kidney injury with non anion gap metabolic acidosis, lactic acid 3.1.  Urinalysis notable for greater than 100 WBCs, many bacteria, leuk esterase, 2+ protein, 3+ blood. Patient is admitted to [facility] for sepsis secondary to E coli bacteremia from UTI source. Pending susceptibilities     Sepsis with Acute Organ Dysfunction  E. Coli Bacteremia  Complicated Urinary Tract Infection in setting of L acute hydronephrosis, R chronic hydronephrosis  Acute Metabolic Encephalopathy   - Source: Urine  - UA: >100WBCs, many bacteria, leuk esterase, 3+ blood   - End Organ dysfunction: CASIE, encephalopathy  - SIRS 3/4, qSOFA 2/3, MEWS 4, Gestalt +  - Continue volume resuscitation   - LA 3.1 >  1  - CT AP w/o chronic R hydronephrosis but atrophic. L sided nephrosis as well with bladder calculi  - Urology consulted, appreciate recs. Pandya in place for 5 days, to be taken out for 7/30 with urology follow up  - Bcx positive x2 for E. Coli, pending susceptibilities  - Ucx pending  - continue zosyn, de-escalte vancomycin       Stage 1 Acute Kidney Injury on Chronic Kidney Disease Stage 2  Chronic R Sided Hydronephrosis   Acute L sided hydronephrosis  Bladder calculi   - Baseline Cr 1.0, GFR >60 now 1.9, 36 respectively   - Suspect 2/2 distributive volume loss iso sepsis on background of known R sided hydronephrosis   - Continue volume resuscitation   - CT A/P w/o  - Strict I/Os  - Renally dose medication  - Urology consulted       Non-Anion Gap Metabolic Acidosis   Anion Gap Metabolic Acidosis   - AG 8, HCO3- 18, LA 3.1  - Mixed gap/non-gap  - No diarrhea   - Trend LA, continue IVF  - Daily BMP, if no improvement consider UAG       Normocytic Anemia   Anemia of Chronic Disease  - Hgb 9.3, MCV 95  - No recent bleeding/melena   - Not UTD with age appropriate Ca screening   - anemia work up remarkable for anemia of chronic disease       Thrombocytopenia   - 109 on admit, relatively chronic near baseline   - CTM while inpatient        Hypertension   - Holding home antihypertensives        Intellectual Disability  - Unifying diagnosis not available in chart  - Has had issues since birth per sister  - Patient with contractions of extremities and abdominal wall at baseline. CP?  - Near baseline per caregiver       Indwelling Lines:     Peripheral IV 07/23/25 2000 20 G Anterior;Right Upper Arm (Active)          VTE Ppx:  VTE Risk Mitigation (From admission, onward)           Ordered     enoxaparin injection 30 mg  Daily         07/23/25 2259     IP VTE HIGH RISK PATIENT  Once         07/23/25 2259     Place sequential compression device  Until discontinued         07/23/25 2259                  Diet: NPO  Code:  Full  Dispo: Floor. Tele. Pending improved hemodynamics, renal function. Pending Bcx susceptibilities  Estimated LOS: 2-3 days     Patient seen and discussed with attending physician: Jacquelin Fisher MD. Attestation may differ from plan, please appreciate.       Leighton Lee MD  [facility]    [facility] Medicine Hospitalist Phone numbers:   [facility] Hospitalist Medicine Team A (Dolores/Phillip): [phone]  [facility] Hospitalist Medicine Team B (Leonardo/Melony):  [phone]

## 2025-07-25 NOTE — PLAN OF CARE
Problem: Hospitalized Older Adult  Goal: Optimal Cognitive Function  Outcome: Progressing  Goal: Optimal Coping  Outcome: Progressing     Problem: Adult Inpatient Plan of Care  Goal: Plan of Care Review  Outcome: Progressing     Problem: UTI (Urinary Tract Infection)  Goal: Improved Infection Symptoms  Outcome: Progressing     Problem: Fall Injury Risk  Goal: Absence of Fall and Fall-Related Injury  Outcome: Progressing

## 2025-07-25 NOTE — SUBJECTIVE & OBJECTIVE
Interval History: NAEO. AFVSS. No fevers since chinchilla placement. Initial output when placed 750 cc. Blood cultures growing GNR x2. Urine culture pending.     Objective:     Temp:  [96.9 °F (36.1 °C)-98.9 °F (37.2 °C)] 97.8 °F (36.6 °C)  Pulse:  [71-84] 73  Resp:  [17-18] 18  SpO2:  [95 %-100 %] 96 %  BP: (116-137)/(48-75) 137/70     Body mass index is 16.25 kg/m².           Drains       Drain  Duration                  Urethral Catheter 07/24/25 0915 16 Fr. <1 day                     Physical Exam  Constitutional:       General: He is not in acute distress.     Appearance: He is ill-appearing.   HENT:      Head: Normocephalic and atraumatic.   Cardiovascular:      Rate and Rhythm: Normal rate.   Pulmonary:      Effort: Pulmonary effort is normal.   Abdominal:      Palpations: Abdomen is soft.   Genitourinary:     Comments: Uncircumcised phallus with orthotopic meatus. Foreskin is easily retracted. 16 Fr chinchilla draining concentrated yellow urine.  Musculoskeletal:      Cervical back: Normal range of motion.      Comments: Well healed R AKA   Skin:     General: Skin is warm and dry.           Significant Labs:    BMP:  Recent Labs   Lab 07/23/25 2030      K 4.6   *   CO2 18*   BUN 34*   CREATININE 1.9*   CALCIUM 8.8       CBC:   Recent Labs   Lab 07/23/25 2030   WBC 4.15   HGB 9.3*   HCT 29.7*   *       Urine Studies:   Recent Labs   Lab 07/23/25  2140   COLORU [place]*   APPEARANCEUA Cloudy*   PHUR 7.0   SPECGRAV 1.010   PROTEINUA 2+*   GLUCUA Negative   BILIRUBINUA Negative   OCCULTUA 3+*   NITRITE Negative   UROBILINOGEN Negative   LEUKOCYTESUR 3+*   RBCUA 85*   WBCUA >100*   BACTERIA Many*   HYALINECASTS 0     All pertinent labs results from the past 24 hours have been reviewed.    Significant Imaging:  All pertinent imaging results/findings from the past 24 hours have been reviewed.

## 2025-07-25 NOTE — ASSESSMENT & PLAN NOTE
Catherine Wilkinson is a 79 y.o.M with sepsis, bilateral hydronephrosis and urinary retention.    - PVR bladder scan showed >350 cc  - Uncomplicated placement of 16 Fr Pandya with immediate output of 700 cc purulent urine  - Recommend maintain Pandya on discharge  - Recommend aggressive bowel regimen  - Follow up urine and blood cultures, tailor antibiotics to cultures  - Rest of care per primary  - Urology will sign off

## 2025-07-25 NOTE — PROGRESS NOTES
Pharmacist Renal Dose Adjustment Note    Catherine Wilkinson is a 79 y.o. male being treated with the medication zosyn    Patient Data:    Vital Signs (Most Recent):  Temp: 97.7 °F (36.5 °C) (07/25/25 1105)  Pulse: 68 (07/25/25 1105)  Resp: 18 (07/25/25 1105)  BP: 112/63 (07/25/25 1105)  SpO2: 96 % (07/25/25 1105) Vital Signs (72h Range):  Temp:  [96.9 °F (36.1 °C)-103.1 °F (39.5 °C)]   Pulse:  []   Resp:  [17-24]   BP: (112-142)/(48-75)   SpO2:  [95 %-100 %]      Recent Labs   Lab 07/23/25  2030 07/25/25  0727   CREATININE 1.9* 1.4     Serum creatinine: 1.4 mg/dL 07/25/25 0727  Estimated creatinine clearance: 24.8 mL/min    Medication:zosyn dose: 4.5g frequency q12h will be changed to medication:zosyn dose:4.5g frequency:q8h    Pharmacist's Name: David Ennis  Pharmacist's Extension: [phone]

## 2025-07-25 NOTE — PLAN OF CARE
Problem: Hospitalized Older Adult  Goal: Optimal Cognitive Function  Outcome: Progressing  Goal: Effective Bowel Elimination  Outcome: Progressing  Goal: Optimal Coping  Outcome: Progressing  Goal: Fluid and Electrolyte Balance  Outcome: Progressing  Goal: Optimal Functional Ability  Outcome: Progressing  Goal: Improved Oral Intake  Outcome: Progressing  Goal: Adequate Sleep/Rest  Outcome: Progressing  Goal: Effective Urinary Elimination  Outcome: Progressing     Problem: Adult Inpatient Plan of Care  Goal: Plan of Care Review  Outcome: Progressing  Goal: Patient-Specific Goal (Individualized)  Outcome: Progressing  Goal: Absence of Hospital-Acquired Illness or Injury  Outcome: Progressing  Goal: Optimal Comfort and Wellbeing  Outcome: Progressing  Goal: Readiness for Transition of Care  Outcome: Progressing     Problem: Adult Inpatient Plan of Care  Goal: Patient-Specific Goal (Individualized)  Outcome: Progressing

## 2025-07-26 LAB
ABSOLUTE EOSINOPHIL (OHS): 0.22 K/UL
ABSOLUTE MONOCYTE (OHS): 0.5 K/UL (ref 0.3–1)
ABSOLUTE NEUTROPHIL COUNT (OHS): 2.66 K/UL (ref 1.8–7.7)
ALBUMIN SERPL BCP-MCNC: 2.2 G/DL (ref 3.5–5.2)
ALP SERPL-CCNC: 84 UNIT/L (ref 40–150)
ALT SERPL W/O P-5'-P-CCNC: 16 UNIT/L (ref 10–44)
ANION GAP (OHS): 6 MMOL/L (ref 8–16)
AST SERPL-CCNC: 30 UNIT/L (ref 11–45)
BACTERIA BLD CULT: ABNORMAL
BACTERIA UR CULT: ABNORMAL
BASOPHILS # BLD AUTO: 0.02 K/UL
BASOPHILS NFR BLD AUTO: 0.5 %
BILIRUB SERPL-MCNC: 0.4 MG/DL (ref 0.1–1)
BUN SERPL-MCNC: 18 MG/DL (ref 8–23)
CALCIUM SERPL-MCNC: 8.4 MG/DL (ref 8.7–10.5)
CHLORIDE SERPL-SCNC: 115 MMOL/L (ref 95–110)
CO2 SERPL-SCNC: 24 MMOL/L (ref 23–29)
CREAT SERPL-MCNC: 1.4 MG/DL (ref 0.5–1.4)
ERYTHROCYTE [DISTWIDTH] IN BLOOD BY AUTOMATED COUNT: 13.2 % (ref 11.5–14.5)
GFR SERPLBLD CREATININE-BSD FMLA CKD-EPI: 51 ML/MIN/1.73/M2
GLUCOSE SERPL-MCNC: 99 MG/DL (ref 70–110)
GRAM STN SPEC: ABNORMAL
HCT VFR BLD AUTO: 29 % (ref 40–54)
HGB BLD-MCNC: 9 GM/DL (ref 14–18)
IMM GRANULOCYTES # BLD AUTO: 0.01 K/UL (ref 0–0.04)
IMM GRANULOCYTES NFR BLD AUTO: 0.2 % (ref 0–0.5)
LYMPHOCYTES # BLD AUTO: 0.63 K/UL (ref 1–4.8)
MAGNESIUM SERPL-MCNC: 2.1 MG/DL (ref 1.6–2.6)
MCH RBC QN AUTO: 28.9 PG (ref 27–31)
MCHC RBC AUTO-ENTMCNC: 31 G/DL (ref 32–36)
MCV RBC AUTO: 93 FL (ref 82–98)
NUCLEATED RBC (/100WBC) (OHS): 0 /100 WBC
PHOSPHATE SERPL-MCNC: 2.4 MG/DL (ref 2.7–4.5)
PLATELET # BLD AUTO: 110 K/UL (ref 150–450)
PMV BLD AUTO: 9.8 FL (ref 9.2–12.9)
POTASSIUM SERPL-SCNC: 3.8 MMOL/L (ref 3.5–5.1)
PROT SERPL-MCNC: 6.4 GM/DL (ref 6–8.4)
RBC # BLD AUTO: 3.11 M/UL (ref 4.6–6.2)
RELATIVE EOSINOPHIL (OHS): 5.4 %
RELATIVE LYMPHOCYTE (OHS): 15.6 % (ref 18–48)
RELATIVE MONOCYTE (OHS): 12.4 % (ref 4–15)
RELATIVE NEUTROPHIL (OHS): 65.9 % (ref 38–73)
SODIUM SERPL-SCNC: 145 MMOL/L (ref 136–145)
WBC # BLD AUTO: 4.04 K/UL (ref 3.9–12.7)

## 2025-07-26 PROCEDURE — 21400001 HC TELEMETRY ROOM

## 2025-07-26 PROCEDURE — 80053 COMPREHEN METABOLIC PANEL: CPT

## 2025-07-26 PROCEDURE — 25000003 PHARM REV CODE 250

## 2025-07-26 PROCEDURE — 63600175 PHARM REV CODE 636 W HCPCS

## 2025-07-26 PROCEDURE — 85025 COMPLETE CBC W/AUTO DIFF WBC: CPT

## 2025-07-26 PROCEDURE — 25000003 PHARM REV CODE 250: Performed by: INTERNAL MEDICINE

## 2025-07-26 PROCEDURE — 83735 ASSAY OF MAGNESIUM: CPT

## 2025-07-26 PROCEDURE — 63600175 PHARM REV CODE 636 W HCPCS: Performed by: INTERNAL MEDICINE

## 2025-07-26 PROCEDURE — 84100 ASSAY OF PHOSPHORUS: CPT

## 2025-07-26 PROCEDURE — 36415 COLL VENOUS BLD VENIPUNCTURE: CPT

## 2025-07-26 RX ORDER — SODIUM,POTASSIUM PHOSPHATES 280-250MG
2 POWDER IN PACKET (EA) ORAL ONCE
Status: COMPLETED | OUTPATIENT
Start: 2025-07-26 | End: 2025-07-26

## 2025-07-26 RX ORDER — CEFTRIAXONE 1 G/1
1 INJECTION, POWDER, FOR SOLUTION INTRAMUSCULAR; INTRAVENOUS
Status: DISCONTINUED | OUTPATIENT
Start: 2025-07-26 | End: 2025-07-27

## 2025-07-26 RX ADMIN — POLYETHYLENE GLYCOL 3350 17 G: 17 POWDER, FOR SOLUTION ORAL at 08:07

## 2025-07-26 RX ADMIN — CEFTRIAXONE SODIUM 1 G: 1 INJECTION, POWDER, FOR SOLUTION INTRAMUSCULAR; INTRAVENOUS at 11:07

## 2025-07-26 RX ADMIN — PIPERACILLIN SODIUM AND TAZOBACTAM SODIUM 4.5 G: 4; .5 INJECTION, POWDER, LYOPHILIZED, FOR SOLUTION INTRAVENOUS at 03:07

## 2025-07-26 RX ADMIN — MUPIROCIN: 20 OINTMENT TOPICAL at 08:07

## 2025-07-26 RX ADMIN — POTASSIUM & SODIUM PHOSPHATES POWDER PACK 280-160-250 MG 2 PACKET: 280-160-250 PACK at 02:07

## 2025-07-26 RX ADMIN — SENNOSIDES 8.6 MG: 8.6 TABLET, FILM COATED ORAL at 08:07

## 2025-07-26 RX ADMIN — ENOXAPARIN SODIUM 30 MG: 30 INJECTION SUBCUTANEOUS at 06:07

## 2025-07-26 NOTE — PLAN OF CARE
Problem: Hospitalized Older Adult  Goal: Optimal Cognitive Function  Outcome: Progressing  Goal: Effective Bowel Elimination  Outcome: Progressing  Goal: Optimal Coping  Outcome: Progressing  Goal: Optimal Functional Ability  Outcome: Progressing  Goal: Improved Oral Intake  Outcome: Progressing  Goal: Adequate Sleep/Rest  Outcome: Progressing

## 2025-07-26 NOTE — PROGRESS NOTES
[facility] Progress Note      Patient Name: Catherine Wilkinson  MRN: [id number]  Admission Date: 7/23/2025  Hospital Length of Stay: 3 days  Attending Provider: Jacquelin Fisher MD  Primary Care Provider: Eitan Dhaliwal MD  Principal Problem: Sepsis with acute organ dysfunction without septic shock     SUBJECTIVE/INTERVAL HISTORY:       No acute events overnight. Patient sitting up in bed this AM. Afebrile over >24 hours. Blood cx positive for Ecoli pending susceptibilities. No complaints overnight per family. Ate breakfast this AM.    MEDICATIONS:      Scheduled Medications    enoxparin  30 mg Subcutaneous Daily    mupirocin   Nasal BID    piperacillin-tazobactam (Zosyn) IV (PEDS and ADULTS) (extended infusion is not appropriate)  4.5 g Intravenous Q8H    polyethylene glycol  17 g Oral Daily    senna  8.6 mg Oral Daily      PRN Medications     Current Facility-Administered Medications:     dextrose 50%, 12.5 g, Intravenous, PRN    dextrose 50%, 25 g, Intravenous, PRN    glucagon (human recombinant), 1 mg, Intramuscular, PRN    glucose, 16 g, Oral, PRN    glucose, 24 g, Oral, PRN    naloxone, 0.02 mg, Intravenous, PRN    sodium chloride 0.9%, 10 mL, Intravenous, Q12H PRN  IV Fluids               OBJECTIVE DATA:      Weight  Weight: 40.3 kg (88 lb 13.5 oz)  Body mass index is 16.25 kg/m².    Latest Vitals Vital range last 24 hours   Temp: 98 °F (36.7 °C) (07/26/25 0414)  Pulse: 66 (07/26/25 0414)  Resp: 18 (07/26/25 0414)  BP: 110/65 (07/26/25 0414)  SpO2: 95 % (07/26/25 0414) Temp:  [97.4 °F (36.3 °C)-98.7 °F (37.1 °C)] 98 °F (36.7 °C)  Pulse:  [66-83] 66  Resp:  [18] 18  SpO2:  [90 %-97 %] 95 %  BP: (105-137)/(57-71) 110/65     In/out Last 24 hours In/Out Since Admission     Intake/Output Summary (Last 24 hours) at 7/26/2025 0647  Last data filed at 7/25/2025 1818  Gross per 24 hour   Intake --   Output 902 ml   Net -902 ml    Net IO Since Admission: -4,170 mL [07/26/25 0647]       Physical Exam  Constitutional:  Alert, moving  "extremities spontaneously, interactive  HEENT: Sclera anicteric, extraocular motions intact. Dry mucous membranes   Cardiovascular:  regular rate, regular rhythm  Pulmonary: Clear to auscultation bilaterally  Abdominal: Abdomen taught, not distended, does not wince to palpation. Contracted.   Skin: No ecchymosis, erythema, or ulcers  : chinchilla in place  Neuro/Psych:  Patient unable to participate in neurologic exam however is moving his extremities spontaneously.  Alert and oriented times 0.  Groans in response to questioning.  Near approximate baseline per caregiver      Labs    Recent Labs   Lab 07/23/25 2030 07/25/25 0727   MG 2.4 2.1   PHOS 2.2* 2.2*       No results for input(s): "TROPONINI", "BNP" in the last 168 hours.    Glucose Trends  Recent Labs   Lab 07/23/25 2030 07/25/25 0727   * 87       Last Urinalysis  Recent Labs   Lab 07/23/25 2140   COLORU McNeil*   APPEARANCEUA Cloudy*   SPECGRAV 1.010   PHUR 7.0   PROTEINUA 2+*   OCCULTUA 3+*   NITRITE Negative   UROBILINOGEN Negative   BILIRUBINUA Negative   LEUKOCYTESUR 3+*   RBCUA 85*   WBCUA >100*   BACTERIA Many*   HYALINECASTS 0        Microbiology  Microbiology Results (last 7 days)       Procedure Component Value Units Date/Time    Blood culture [[id number]]  (Normal) Collected: 07/25/25 0953    Order Status: Completed Specimen: Blood Updated: 07/25/25 2100     Blood Culture No Growth After 6 Hours    Blood culture x two cultures. Draw prior to antibiotics [[id number]]  (Abnormal) Collected: 07/23/25 2020    Order Status: Completed Specimen: Blood from Peripheral, Forearm, Right Updated: 07/25/25 1158     Blood Culture Escherichia coli     Comment: Susceptibility pending        GRAM STAIN Gram Negative Rods     Comment: Anaerobic Bottle Positive        Urine culture [[id number]]  (Abnormal) Collected: 07/23/25 2140    Order Status: Completed Specimen: Urine Updated: 07/25/25 0841     Urine Culture >100,000 cfu/ml Gram-negative Rods     " Comment: Identification and susceptibility pending       Rapid Organism ID by PCR (from Blood culture) [[id number]] Collected: 07/23/25 2021    Order Status: Canceled Specimen: Blood from Peripheral, Hand, Left Updated: 07/25/25 0244    Blood culture x two cultures. Draw prior to antibiotics [[id number]]  (Abnormal) Collected: 07/23/25 2021    Order Status: Completed Specimen: Blood from Peripheral, Hand, Left Updated: 07/25/25 0244     Blood Culture Positive - Anaerobic Bottle     GRAM STAIN Gram Negative Rods     Comment: Anaerobic Bottle Positive        Rapid Organism ID by PCR (from Blood culture) [[id number]]  (Abnormal) Collected: 07/23/25 2020    Order Status: Completed Specimen: Blood from Peripheral, Forearm, Right Updated: 07/24/25 2141     Enterococcus faecalis Not Detected     Enterococcus faecium Not Detected     Listeria monocytogenes Not Detected     Staphylococcus spp. Not Detected     Staphylococcus aureus Not Detected     Staphylococcus epidermidis Not Detected     Staphylococcus lugdunensis Not Detected     Streptococcus spp. Not Detected     Streptococcus agalactiae (Group B) Not Detected     Streptococcus pneumoniae Not Detected     Streptococcus pyogenes (Group A) Not Detected     Acinetobacter calcoaceticus/baumannii complex Not Detected     Bacteroides fragilis Not Detected     Enterobacterales See Species for ID     Enterobacter cloacae complex Not Detected     Escherichia coli Detected     Klebsiella aerogenes Not Detected     Klebsiella oxytoca Not Detected     Klebsiella pneumoniae group Not Detected     Proteus spp. Not Detected     Salmonella spp. Not Detected     Serratia marcescens Not Detected     Haemophilus influenzae Not Detected     Neisseria meningitidis Not Detected     Pseudomonas aeruginosa Not Detected     Stenotrophomonas maltophilia Not Detected     Candida albicans Not Detected     Candida auris Not Detected     Candida glabrata Not Detected     Candy krusei Not  Detected     Candida parapsilosis Not Detected     Candida tropicalis Not Detected     Cryptococcus neoformans/gattii Not Detected     CTX-M (ESBL ) Not Detected     Comment: Note: Antimicrobial resistance can occur via multiple mechanisms. A Not Detected result for antimicrobial resistance gene(s) does not indicate antimicrobial susceptibility. Subculturing is required for species identification and susceptibility testing of   isolates.        IMP (Cabapenemase ) Not Detected     Comment: Note: Antimicrobial resistance can occur via multiple mechanisms. A Not Detected result for antimicrobial resistance gene(s) does not indicate antimicrobial susceptibility. Subculturing is required for species identification and susceptibility testing of   isolates.        KPC resistance gene (Carbapenemase ) Not Detected     Comment: Note: Antimicrobial resistance can occur via multiple mechanisms. A Not Detected result for antimicrobial resistance gene(s) does not indicate antimicrobial susceptibility. Subculturing is required for species identification and susceptibility testing of   isolates.        mcr-1 Not Detected     Comment: Note: Antimicrobial resistance can occur via multiple mechanisms. A Not Detected result for antimicrobial resistance gene(s) does not indicate antimicrobial susceptibility. Subculturing is required for species identification and susceptibility testing of   isolates.        mecA ID N/A     Comment: Note: Antimicrobial resistance can occur via multiple mechanisms. A Not Detected result for antimicrobial resistance gene(s) does not indicate antimicrobial susceptibility. Subculturing is required for species identification and susceptibility testing of   isolates.        mecA/C and MREJ (MRSA) gene N/A     Comment: Note: Antimicrobial resistance can occur via multiple mechanisms. A Not Detected result for antimicrobial resistance gene(s) does not indicate antimicrobial  susceptibility. Subculturing is required for species identification and susceptibility testing of   isolates.        NDM (Carbapenemase ) Not Detected     Comment: Note: Antimicrobial resistance can occur via multiple mechanisms. A Not Detected result for antimicrobial resistance gene(s) does not indicate antimicrobial susceptibility. Subculturing is required for species identification and susceptibility testing of   isolates.        OXA-48-like (Carbapenemase ) Not Detected     Comment: Note: Antimicrobial resistance can occur via multiple mechanisms. A Not Detected result for antimicrobial resistance gene(s) does not indicate antimicrobial susceptibility. Subculturing is required for species identification and susceptibility testing of   isolates.        Esther/B (VRE gene) N/A     Comment: Note: Antimicrobial resistance can occur via multiple mechanisms. A Not Detected result for antimicrobial resistance gene(s) does not indicate antimicrobial susceptibility. Subculturing is required for species identification and susceptibility testing of   isolates.        VIM (Carbapenemase ) Not Detected     Comment: Note: Antimicrobial resistance can occur via multiple mechanisms. A Not Detected result for antimicrobial resistance gene(s) does not indicate antimicrobial susceptibility. Subculturing is required for species identification and susceptibility testing of   isolates.       MRSA Screen by PCR [[id number]]     Order Status: Sent Specimen: Nasal Swab           Antibiotics (From admission, onward)      Start     Stop Route Frequency Ordered    07/25/25 1915  piperacillin-tazobactam (ZOSYN) 4.5 g in D5W 100 mL IVPB (MB+)         -- IV Every 8 hours (non-standard times) 07/25/25 1128    07/25/25 1230  mupirocin 2 % ointment         07/30/25 0859 Nasl 2 times daily 07/25/25 1128             EKG  Results for orders placed or performed during the hospital encounter of 11/01/23   EKG 12-lead     Collection Time: 11/01/23  6:00 PM    Narrative    Test Reason : N39.0,    Vent. Rate : 093 BPM     Atrial Rate : 093 BPM     P-R Int : 124 ms          QRS Dur : 068 ms      QT Int : 336 ms       P-R-T Axes : 069 -04 052 degrees     QTc Int : 417 ms    Normal sinus rhythm  Normal ECG  When compared with ECG of 29-JAN-2021 16:47,  ST no longer elevated in Inferior leads  T wave inversion no longer evident in Inferior leads  T wave amplitude has increased in Anterior leads  Confirmed by Kal Yousif MD ([id number]) on 11/2/2023 6:00:05 PM    Referred By: [email or web address]   SELF           Confirmed By:Kal Yousif MD        I have personally reviewed the above EKG    Radiology  CT Renal Stone Study Abd Pelvis WO   Final Result      Moderate urinary bladder distension with severe right, moderate left hydronephrosis.  Findings may be due to bladder outlet obstruction.      Large volume of stool in the rectum, correlate for constipation/fecal impaction.      Several urinary bladder calculi.         Electronically signed by: Donta Arora   Date:    07/24/2025   Time:    00:24      X-Ray Chest 1 View   Final Result      No acute abnormality.         Electronically signed by: Donta Arora   Date:    07/23/2025   Time:    21:26           I have personally reviewed the above imaging      Assessment/Plan:     Catherine Wilkinson is a 79 y.o. male with intellectual disability, chronic right-sided hydronephrosis, hypertension who presented to [facility] on 7/23/2025 for fever/decreased po intake/lethargy [id number].  In the ED, patient tachycardic to low 100s with soft blood pressures and fever with T-max of 103.1°.  Physical exam largely unrevealing - baseline per caregiver. Labs remarkable for stage I acute kidney injury with non anion gap metabolic acidosis, lactic acid 3.1.  Urinalysis notable for greater than 100 WBCs, many bacteria, leuk esterase, 2+ protein, 3+ blood. Patient is admitted to [facility] for sepsis secondary to E coli  bacteremia from UTI source. Pending susceptibilities     Sepsis with Acute Organ Dysfunction  E. Coli Bacteremia  Complicated Urinary Tract Infection in setting of L acute hydronephrosis, R chronic hydronephrosis  Acute Metabolic Encephalopathy   - Source: Urine  - UA: >100WBCs, many bacteria, leuk esterase, 3+ blood   - End Organ dysfunction: CASIE, encephalopathy  - SIRS 3/4, qSOFA 2/3, MEWS 4, Gestalt +  - Continue volume resuscitation   - LA 3.1 > 1  - CT AP w/o chronic R hydronephrosis but atrophic. L sided nephrosis as well with bladder calculi  - Urology consulted, appreciate recs. Plan to keep chinchilla in on discharge and follow up outpatient with repeat imaging, will consider voiding trial at that time   - Bcx positive x2 for E. Coli, pending susceptibilities  - Ucx with >100,000 GNR   - continue zosyn, de-escalte vancomycin  - repeat BC ordered on 7/25      Stage 1 Acute Kidney Injury on Chronic Kidney Disease Stage 2  Chronic R Sided Hydronephrosis   Acute L sided hydronephrosis  Bladder calculi   - Baseline Cr 1.0, GFR >60 now 1.9, 36 respectively   - Suspect 2/2 distributive volume loss iso sepsis on background of known R sided hydronephrosis   - Continue volume resuscitation   - CT renal stone showed moderate urinary bladder distension with severe rt, mod left hydronephrosis  - Strict I/Os  - Renally dose medication  - Urology consulted, plan to keep chinchilla in on discharge and follow up outpatient with repeat imaging, will consider voiding trial at that time      Non-Anion Gap Metabolic Acidosis   Anion Gap Metabolic Acidosis   - AG 8, HCO3- 18, LA 3.1  - Mixed gap/non-gap  - No diarrhea   - Trend LA, continue IVF  - Daily BMP, if no improvement consider UAG     Normocytic Anemia   Anemia of Chronic Disease  - Hgb 9.3, MCV 95  - No recent bleeding/melena   - Not UTD with age appropriate Ca screening   - anemia work up remarkable for anemia of chronic disease     Thrombocytopenia   - 109 on admit, relatively  chronic near baseline   - CTM while inpatient      Hypertension   - Holding home antihypertensives      Intellectual Disability  - Unifying diagnosis not available in chart  - Has had issues since birth per sister  - Patient with contractions of extremities and abdominal wall at baseline. CP?  - Near baseline per caregiver       Indwelling Lines:     Peripheral IV 07/23/25 2000 20 G Anterior;Right Upper Arm (Active)          VTE Ppx:  VTE Risk Mitigation (From admission, onward)           Ordered     enoxaparin injection 30 mg  Daily         07/23/25 2259     IP VTE HIGH RISK PATIENT  Once         07/23/25 2259     Place sequential compression device  Until discontinued         07/23/25 2259                  Diet: soft, bite sized  Code: Full  Dispo: Floor. Tele. Pending improved hemodynamics, renal function. Pending Bcx susceptibilities  Estimated LOS: 2-3 days     Patient seen and discussed with attending physician: Jacquelin Fisher MD. Attestation may differ from plan, please appreciate.     Michell Thacker MD   [facility] PGY-2      hospitals Medicine Hospitalist Phone numbers:   hospitals Hospitalist Medicine Team A (Dolores/Phillip): [phone]  hospitals Hospitalist Medicine Team B (Leonardo/Melony):  [phone]

## 2025-07-26 NOTE — NURSING
"Patient found sitting on edge of bed, bed elevated, and bed alarm off. Family admitted to turning bed alarm off and positing patient on edge of bed surrounded by pillows and foam wedge to eat breakfast. Nurse attempted to educate family about fall risk and prevention. Family insisted that the patient will not fall. Safe choices provided to family member, however family declined and stated, " I am in here, watching him (patient)..."he will not fall". Lead floor nurse made aware.  "

## 2025-07-26 NOTE — PLAN OF CARE
Problem: Hospitalized Older Adult  Goal: Optimal Cognitive Function  Outcome: Progressing  Goal: Effective Bowel Elimination  Outcome: Progressing  Goal: Optimal Coping  Outcome: Progressing  Goal: Fluid and Electrolyte Balance  Outcome: Progressing  Goal: Optimal Functional Ability  Outcome: Progressing  Goal: Improved Oral Intake  Outcome: Progressing  Goal: Adequate Sleep/Rest  Outcome: Progressing  Goal: Effective Urinary Elimination  Outcome: Progressing     Problem: Adult Inpatient Plan of Care  Goal: Plan of Care Review  Outcome: Progressing  Goal: Patient-Specific Goal (Individualized)  Outcome: Progressing  Goal: Absence of Hospital-Acquired Illness or Injury  Outcome: Progressing  Goal: Optimal Comfort and Wellbeing  Outcome: Progressing  Goal: Readiness for Transition of Care  Outcome: Progressing     Problem: Sepsis/Septic Shock  Goal: Optimal Coping  Outcome: Progressing  Goal: Absence of Bleeding  Outcome: Progressing  Goal: Blood Glucose Level Within Targeted Range  Outcome: Progressing  Goal: Absence of Infection Signs and Symptoms  Outcome: Progressing  Goal: Optimal Nutrition Intake  Outcome: Progressing

## 2025-07-27 VITALS
HEART RATE: 69 BPM | RESPIRATION RATE: 18 BRPM | DIASTOLIC BLOOD PRESSURE: 50 MMHG | BODY MASS INDEX: 16.36 KG/M2 | TEMPERATURE: 98 F | OXYGEN SATURATION: 97 % | WEIGHT: 88.88 LBS | SYSTOLIC BLOOD PRESSURE: 99 MMHG | HEIGHT: 62 IN

## 2025-07-27 PROBLEM — E87.20 METABOLIC ACIDOSIS: Status: RESOLVED | Noted: 2025-07-24 | Resolved: 2025-07-27

## 2025-07-27 PROBLEM — R65.20 SEPSIS WITH ACUTE ORGAN DYSFUNCTION WITHOUT SEPTIC SHOCK: Status: RESOLVED | Noted: 2025-07-23 | Resolved: 2025-07-27

## 2025-07-27 PROBLEM — G93.40 ACUTE ENCEPHALOPATHY: Status: RESOLVED | Noted: 2025-07-24 | Resolved: 2025-07-27

## 2025-07-27 PROBLEM — K56.41 FECAL IMPACTION: Status: RESOLVED | Noted: 2025-07-24 | Resolved: 2025-07-27

## 2025-07-27 PROBLEM — N17.9 AKI (ACUTE KIDNEY INJURY): Status: RESOLVED | Noted: 2023-11-02 | Resolved: 2025-07-27

## 2025-07-27 PROBLEM — N32.0 BLADDER-NECK OBSTRUCTION: Status: RESOLVED | Noted: 2025-07-25 | Resolved: 2025-07-27

## 2025-07-27 PROBLEM — A41.9 SEPSIS WITH ACUTE ORGAN DYSFUNCTION WITHOUT SEPTIC SHOCK: Status: RESOLVED | Noted: 2025-07-23 | Resolved: 2025-07-27

## 2025-07-27 LAB
ABSOLUTE EOSINOPHIL (OHS): 0.22 K/UL
ABSOLUTE MONOCYTE (OHS): 0.43 K/UL (ref 0.3–1)
ABSOLUTE NEUTROPHIL COUNT (OHS): 2.31 K/UL (ref 1.8–7.7)
ALBUMIN SERPL BCP-MCNC: 2.2 G/DL (ref 3.5–5.2)
ALP SERPL-CCNC: 73 UNIT/L (ref 40–150)
ALT SERPL W/O P-5'-P-CCNC: 13 UNIT/L (ref 10–44)
ANION GAP (OHS): 6 MMOL/L (ref 8–16)
AST SERPL-CCNC: 26 UNIT/L (ref 11–45)
BACTERIA BLD CULT: ABNORMAL
BACTERIA BLD CULT: ABNORMAL
BASOPHILS # BLD AUTO: 0.02 K/UL
BASOPHILS NFR BLD AUTO: 0.5 %
BILIRUB SERPL-MCNC: 0.3 MG/DL (ref 0.1–1)
BUN SERPL-MCNC: 13 MG/DL (ref 8–23)
CALCIUM SERPL-MCNC: 8.3 MG/DL (ref 8.7–10.5)
CHLORIDE SERPL-SCNC: 117 MMOL/L (ref 95–110)
CO2 SERPL-SCNC: 23 MMOL/L (ref 23–29)
CREAT SERPL-MCNC: 1.1 MG/DL (ref 0.5–1.4)
ERYTHROCYTE [DISTWIDTH] IN BLOOD BY AUTOMATED COUNT: 13.2 % (ref 11.5–14.5)
GFR SERPLBLD CREATININE-BSD FMLA CKD-EPI: >60 ML/MIN/1.73/M2
GLUCOSE SERPL-MCNC: 73 MG/DL (ref 70–110)
GRAM STN SPEC: ABNORMAL
HCT VFR BLD AUTO: 27.3 % (ref 40–54)
HGB BLD-MCNC: 8.4 GM/DL (ref 14–18)
IMM GRANULOCYTES # BLD AUTO: 0.02 K/UL (ref 0–0.04)
IMM GRANULOCYTES NFR BLD AUTO: 0.5 % (ref 0–0.5)
LYMPHOCYTES # BLD AUTO: 1.05 K/UL (ref 1–4.8)
MAGNESIUM SERPL-MCNC: 2.1 MG/DL (ref 1.6–2.6)
MCH RBC QN AUTO: 28.9 PG (ref 27–31)
MCHC RBC AUTO-ENTMCNC: 30.8 G/DL (ref 32–36)
MCV RBC AUTO: 94 FL (ref 82–98)
NUCLEATED RBC (/100WBC) (OHS): 0 /100 WBC
PHOSPHATE SERPL-MCNC: 2.5 MG/DL (ref 2.7–4.5)
PLATELET # BLD AUTO: 130 K/UL (ref 150–450)
PMV BLD AUTO: 9.6 FL (ref 9.2–12.9)
POTASSIUM SERPL-SCNC: 3.7 MMOL/L (ref 3.5–5.1)
PROT SERPL-MCNC: 6.2 GM/DL (ref 6–8.4)
RBC # BLD AUTO: 2.91 M/UL (ref 4.6–6.2)
RELATIVE EOSINOPHIL (OHS): 5.4 %
RELATIVE LYMPHOCYTE (OHS): 25.9 % (ref 18–48)
RELATIVE MONOCYTE (OHS): 10.6 % (ref 4–15)
RELATIVE NEUTROPHIL (OHS): 57.1 % (ref 38–73)
SODIUM SERPL-SCNC: 146 MMOL/L (ref 136–145)
WBC # BLD AUTO: 4.05 K/UL (ref 3.9–12.7)

## 2025-07-27 PROCEDURE — 25000003 PHARM REV CODE 250

## 2025-07-27 PROCEDURE — 85025 COMPLETE CBC W/AUTO DIFF WBC: CPT

## 2025-07-27 PROCEDURE — 83735 ASSAY OF MAGNESIUM: CPT

## 2025-07-27 PROCEDURE — 36415 COLL VENOUS BLD VENIPUNCTURE: CPT

## 2025-07-27 PROCEDURE — 82040 ASSAY OF SERUM ALBUMIN: CPT

## 2025-07-27 PROCEDURE — 84100 ASSAY OF PHOSPHORUS: CPT

## 2025-07-27 RX ORDER — LEVOFLOXACIN 750 MG/1
750 TABLET, FILM COATED ORAL EVERY OTHER DAY
Status: DISCONTINUED | OUTPATIENT
Start: 2025-07-27 | End: 2025-07-27

## 2025-07-27 RX ORDER — POLYETHYLENE GLYCOL 3350 17 G/17G
17 POWDER, FOR SOLUTION ORAL DAILY PRN
Qty: 30 PACKET | Refills: 0 | Status: SHIPPED | OUTPATIENT
Start: 2025-07-27 | End: 2025-08-26

## 2025-07-27 RX ORDER — OLMESARTAN MEDOXOMIL 40 MG/1
20 TABLET ORAL DAILY
Qty: 45 TABLET | Refills: 1 | Status: SHIPPED | OUTPATIENT
Start: 2025-07-27 | End: 2026-01-23

## 2025-07-27 RX ORDER — LEVOFLOXACIN 750 MG/1
750 TABLET, FILM COATED ORAL EVERY OTHER DAY
Status: DISCONTINUED | OUTPATIENT
Start: 2025-07-29 | End: 2025-07-27 | Stop reason: HOSPADM

## 2025-07-27 RX ORDER — LEVOFLOXACIN 750 MG/1
750 TABLET, FILM COATED ORAL EVERY OTHER DAY
Qty: 5 TABLET | Refills: 0 | Status: SHIPPED | OUTPATIENT
Start: 2025-07-29 | End: 2025-08-08

## 2025-07-27 RX ORDER — LEVOFLOXACIN 750 MG/1
750 TABLET, FILM COATED ORAL EVERY OTHER DAY
Qty: 5 TABLET | Refills: 0 | Status: CANCELLED | OUTPATIENT
Start: 2025-07-27 | End: 2025-08-06

## 2025-07-27 RX ORDER — LEVOFLOXACIN 500 MG/1
500 TABLET, FILM COATED ORAL DAILY
Qty: 10 TABLET | Refills: 0 | Status: CANCELLED | OUTPATIENT
Start: 2025-07-27 | End: 2025-08-06

## 2025-07-27 RX ADMIN — POLYETHYLENE GLYCOL 3350 17 G: 17 POWDER, FOR SOLUTION ORAL at 08:07

## 2025-07-27 RX ADMIN — MUPIROCIN: 20 OINTMENT TOPICAL at 08:07

## 2025-07-27 RX ADMIN — LEVOFLOXACIN 750 MG: 750 TABLET, FILM COATED ORAL at 10:07

## 2025-07-27 RX ADMIN — SENNOSIDES 8.6 MG: 8.6 TABLET, FILM COATED ORAL at 08:07

## 2025-07-27 NOTE — DISCHARGE SUMMARY
"[facility] Hospital Medicine Discharge Summary    Primary Team: [facility] Hospitalist Team A  Attending Physician: Jacquelin Fisher MD  Resident: Leighton Lee MD  Intern: Kerri Poole MD    Date of Admit: 7/23/2025  Date of Discharge: 7/27/2025    Discharge to: Home  Condition: Stable    Discharge Diagnoses     Sepsis with Acute Organ Dysfunction  Complicated Urinary Tract Infection in setting of L acute hydronephrosis, R chronic hydronephrosis  Acute Metabolic Encephalopathy   Stage 1 Acute Kidney Injury on Chronic Kidney Disease Stage 2  Chronic R Sided Hydronephrosis   Acute L sided hydronephrosis  Bladder calculi   Non-Anion Gap Metabolic Acidosis   Anion Gap Metabolic Acidosis   Normocytic Anemia   Thrombocytopenia  HTN  Intellectual Disability    Consultants and Procedures     Consultants:  Urology    Procedures:   None    Imaging:  See hospital course    Brief History of Present Illness      Catherine Wilkinson is a 79 y.o.male with a PMHx of intellectual disability, chronic R hydronephrosis, hypertension who presented on 7/23/2025 for fever/decreased oral intake/lethargy for the past 24-48hrs. His caregiver sister noted that he had been "a little slow," eating and drinking less, and had cloudy, foul-smelling urine. The patient is normally alert and oriented to 0 but can follow simple commands and responds to questions with grunting, which is baseline for him. Sister reports his mentation is now near baseline. A similar episode occurred in 2023, resulting in a hospitalization where chronic hydronephrosis was found, though follow-up with urology did not occur. There are no reports of pain, gastrointestinal, respiratory, or other systemic symptoms. In the ED, patient tachycardic to low 100s with soft blood pressures and fever with T-max of 103.1°. Physical exam largely unrevealing - baseline per caregiver. Labs remarkable for stage I acute kidney injury with non anion gap metabolic acidosis, lactic acid 3.1.  Urinalysis " notable for greater than 100 WBCs, many bacteria, leuk esterase, 2+ protein, 3+ blood. Patient is admitted to [facility] for sepsis secondary to E coli bacteremia from UTI source. Started on empiric antibiotics. Chinchilla placed with purulent urine initially. Urology consulted. Repeat blood culture negative. CASIE improved. Afebrile for >24hrs. On the day of discharge, patient was afebrile with stable vital signs and will be discharged in stable condition to home or self care with close follow up.     For the full HPI please refer to the History & Physical from this admission.    Hospital Course By Problem with Pertinent Findings     Sepsis with Acute Organ Dysfunction  E. Coli Bacteremia  Complicated Urinary Tract Infection in setting of L acute hydronephrosis, R chronic hydronephrosis  Acute Metabolic Encephalopathy   - Source: Urine  - UA: >100WBCs, many bacteria, leuk esterase, 3+ blood   - End Organ dysfunction: CASIE, encephalopathy  - SIRS 3/4, qSOFA 2/3, MEWS 4, Gestalt +  - Continue volume resuscitation   - LA 3.1 > 1  - CT AP w/o chronic R hydronephrosis but atrophic. L sided nephrosis as well with bladder calculi  - Urology consulted, appreciate recs. Plan to keep chinchilla in on discharge and follow up outpatient with repeat imaging, will consider voiding trial at that time   - Bcx positive x2 for E. Coli; sensitive to cipro/levaquin/CTX  - Ucx with >100,000 GNR   - discontinue vanc/zosyn -> de-escalate to ceftriaxone  - repeat BC ordered on 7/25 with NGTD  - switch to oral abx on discharge -- continue levofloxacin for 12 days to finish 14 day course     Stage 1 Acute Kidney Injury on Chronic Kidney Disease Stage 2  Chronic R Sided Hydronephrosis   Acute L sided hydronephrosis  Bladder calculi   - Baseline Cr 1.0, GFR >60 now 1.9, 36 respectively   - Suspect 2/2 distributive volume loss iso sepsis on background of known R sided hydronephrosis   - Continue volume resuscitation   - CT renal stone showed moderate  urinary bladder distension with severe rt, mod left hydronephrosis  - Strict I/Os  - Renally dose medication  - Urology consulted, plan to keep chinchilla in on discharge and follow up outpatient with repeat imaging, will consider voiding trial at that time      Non-Anion Gap Metabolic Acidosis   Anion Gap Metabolic Acidosis   - AG 8, HCO3- 18, LA 3.1  - Mixed gap/non-gap  - No diarrhea   - continue IVF  - LA 3.1 > 1  - Daily BMP, if no improvement consider UAG     Normocytic Anemia   Anemia of Chronic Disease  - Hgb 9.3, MCV 95  - No recent bleeding/melena   - Not UTD with age appropriate Ca screening   - anemia work up remarkable for anemia of chronic disease     Thrombocytopenia   - 109 on admit, relatively chronic near baseline   - CTM while inpatient      Hypertension   - Holding home antihypertensives      Intellectual Disability  - Unifying diagnosis not available in chart  - Has had issues since birth per sister  - Patient with contractions of extremities and abdominal wall at baseline. CP?  - Near baseline per caregiver     Discharge Medications        Medication List        START taking these medications      levoFLOXacin 750 MG tablet  Commonly known as: LEVAQUIN  Take 1 tablet (750 mg total) by mouth every other day. for 10 days  Start taking on: July 29, 2025     polyethylene glycol 17 gram Pwpk  Commonly known as: GLYCOLAX  Take 17 g by mouth daily as needed for Constipation.            CHANGE how you take these medications      olmesartan 40 MG tablet  Commonly known as: BENICAR  Take 0.5 tablets (20 mg total) by mouth once daily.  What changed: how much to take            STOP taking these medications      amLODIPine 10 MG tablet  Commonly known as: NORVASC               Where to Get Your Medications        These medications were sent to [facility]/pharmacy #[id number] - [place] - [address]  [address] [place] [address]      Phone: [phone]   levoFLOXacin 750 MG tablet  olmesartan 40  MG tablet  polyethylene glycol 17 gram Pwpk         Discharge Information:     Diet:  Soft & bite sized    Physical Activity:  As tolerated             Instructions:  1. Take all medications as prescribed  2. Keep all follow-up appointments  3. Return to the hospital or call your primary care physicians if any worsening symptoms such as fever, chest pain, shortness of breath, return of symptoms, or any other concerns.    Follow-Up Appointments:  PCP  Urology    Kerri Poole MD  [facility] -I  [facility] Internal Medicine Team A

## 2025-07-27 NOTE — PLAN OF CARE
07/27/25 1128   Final Note   Assessment Type Discharge Planning Assessment   Anticipated Discharge Disposition Home-Health  ([facility])   What phone number can be called within the next 1-3 days to see how you are doing after discharge? [phone]   Hospital Resources/Appts/Education Provided Appointment suggestion unavailable  (referrals place . family will scheduled pcp appt)   Post-Acute Status   Discharge Delays None known at this time     RGIO spoke with patient sister Mariana at bed side. Mariana and or Florinda will be transporting pt home. Pt has been accepted by [facility]. Referral has been placed for urology. Patient and or family will scheduled PCP follow-up. RIGO spoke with patient sister Mariana . Mariana requesting frozen meals . RIGO encouraged patient sister to contact [facility] and request post discharge meals     Elizabeth Arora LMSW  Weekend

## 2025-07-27 NOTE — NURSING
07/27/25 1345   AVS Confirmation   Discharge instructions and AVS provided to and reviewed with patient and/or significant other. Yes       AVS printed and handed to patient by bedside nurse. VN reviewed discharge instructions with patient and family using teachback method.  Allowed time for questions, all questions answered.  Patient's family verbalized complete understanding of discharge instructions. Family requesting hospital bed with railing and meals on wheels resources.  Jaycee notified for followu up. Discharge instructions complete. Wheelchair requested. Bedside nurse notified.

## 2025-07-27 NOTE — NURSING
Patient's family educated on chinchilla care. Family verbalized understanding. Chinchilla care supplies provided to family.

## 2025-07-27 NOTE — DISCHARGE INSTRUCTIONS
Continue levofloxacin 750 mg every other day until 8/8/2025. Take as written on prescription  Keep chinchilla on discharge, follow up on with urology within 2 weeks for further evaluation.              Our goal at [facility] is to always give you outstanding care and exceptional service. You may receive a survey from [employer] by mail, text or e-mail in the next 24-48 hours asking about the care you received with us. The survey should only take 5-10 minutes to complete and is very important to us.     Your feedback provides us with a way to recognize our staff who work tirelessly to provide the best care! Also, your responses help us learn how to improve when your experience was below our aspiration of excellence. We are always looking for ways to improve your stay. We WILL use your feedback to continue making improvements to help us provide the highest quality care. We keep your personal information and feedback confidential. We appreciate your time completing this survey and can't wait to hear from you!!!    We look forward to your continued care with us! Thanks so much for choosing [facility] for your healthcare needs!

## 2025-07-27 NOTE — PROGRESS NOTES
[facility] Progress Note      Patient Name: Catherine Wilkinson  MRN: [id number]  Admission Date: 7/23/2025  Hospital Length of Stay: 4 days  Attending Provider: Jacuqelin Fisher MD  Primary Care Provider: Eitan Dhaliwal MD  Principal Problem: Sepsis with acute organ dysfunction without septic shock     SUBJECTIVE/INTERVAL HISTORY:       No acute events overnight. Patient slept well, per family. Afebrile over >24 hours. Blood cx (7/23) positive for Ecoli, started on CTX. No complaints overnight per family.     MEDICATIONS:      Scheduled Medications    cefTRIAXone (Rocephin) IV (PEDS and ADULTS)  1 g Intravenous Q24H    enoxparin  30 mg Subcutaneous Daily    mupirocin   Nasal BID    polyethylene glycol  17 g Oral Daily    senna  8.6 mg Oral Daily      PRN Medications     Current Facility-Administered Medications:     dextrose 50%, 12.5 g, Intravenous, PRN    dextrose 50%, 25 g, Intravenous, PRN    glucagon (human recombinant), 1 mg, Intramuscular, PRN    glucose, 16 g, Oral, PRN    glucose, 24 g, Oral, PRN    naloxone, 0.02 mg, Intravenous, PRN    sodium chloride 0.9%, 10 mL, Intravenous, Q12H PRN  IV Fluids               OBJECTIVE DATA:      Weight  Weight: 40.3 kg (88 lb 13.5 oz)  Body mass index is 16.25 kg/m².    Latest Vitals Vital range last 24 hours   Temp: 98 °F (36.7 °C) (07/27/25 0819)  Pulse: 63 (07/27/25 0819)  Resp: 18 (07/27/25 0819)  BP: 107/61 (07/27/25 0819)  SpO2: 98 % (07/27/25 0819) Temp:  [97.7 °F (36.5 °C)-98.7 °F (37.1 °C)] 98 °F (36.7 °C)  Pulse:  [63-78] 63  Resp:  [17-18] 18  SpO2:  [95 %-98 %] 98 %  BP: (107-132)/(61-68) 107/61     In/out Last 24 hours In/Out Since Admission     Intake/Output Summary (Last 24 hours) at 7/27/2025 0871  Last data filed at 7/27/2025 0559  Gross per 24 hour   Intake --   Output 1602 ml   Net -1602 ml    Net IO Since Admission: -6,873 mL [07/27/25 0838]       Physical Exam  Constitutional:  Alert, moving extremities spontaneously, interactive  HEENT: Sclera anicteric,  "extraocular motions intact. Dry mucous membranes   Cardiovascular:  regular rate, regular rhythm  Pulmonary: Clear to auscultation bilaterally  Abdominal: Abdomen taught, not distended, does not wince to palpation. Contracted.   Skin: No ecchymosis, erythema, or ulcers  : chinchilla in place  Neuro/Psych:  Patient unable to participate in neurologic exam however is moving his extremities spontaneously.  Alert and oriented times 0.  Groans in response to questioning.  Near approximate baseline per caregiver      Labs    Recent Labs   Lab 07/23/25 2030 07/25/25 0727 07/26/25  1120   MG 2.4 2.1 2.1   PHOS 2.2* 2.2* 2.4*       No results for input(s): "TROPONINI", "BNP" in the last 168 hours.    Glucose Trends  Recent Labs   Lab 07/23/25 2030 07/25/25 0727 07/26/25  1120   * 87 99       Last Urinalysis  Recent Labs   Lab 07/23/25 2140   COLORU [place]*   APPEARANCEUA Cloudy*   SPECGRAV 1.010   PHUR 7.0   PROTEINUA 2+*   OCCULTUA 3+*   NITRITE Negative   UROBILINOGEN Negative   BILIRUBINUA Negative   LEUKOCYTESUR 3+*   RBCUA 85*   WBCUA >100*   BACTERIA Many*   HYALINECASTS 0        Microbiology  Microbiology Results (last 7 days)       Procedure Component Value Units Date/Time    Blood culture [[id number]]  (Normal) Collected: 07/25/25 0953    Order Status: Completed Specimen: Blood Updated: 07/27/25 0300     Blood Culture No Growth After 36 Hours    Blood culture x two cultures. Draw prior to antibiotics [[id number]]  (Abnormal) Collected: 07/23/25 2021    Order Status: Completed Specimen: Blood from Peripheral, Hand, Left Updated: 07/26/25 1117     Blood Culture Positive - Anaerobic Bottle      Escherichia coli     Comment: For susceptibility refer to order #  - [id number]        GRAM STAIN Gram Negative Rods     Comment: Anaerobic Bottle Positive        Urine culture [[id number]]  (Abnormal)  (Susceptibility) Collected: 07/23/25 2140    Order Status: Completed Specimen: Urine Updated: 07/26/25 0957     " Urine Culture >100,000 cfu/ml Escherichia coli    Blood culture x two cultures. Draw prior to antibiotics [[id number]]  (Abnormal)  (Susceptibility) Collected: 07/23/25 2020    Order Status: Completed Specimen: Blood from Peripheral, Forearm, Right Updated: 07/26/25 0852     Blood Culture Escherichia coli     GRAM STAIN Gram Negative Rods     Comment: Anaerobic Bottle Positive        Rapid Organism ID by PCR (from Blood culture) [[id number]] Collected: 07/23/25 2021    Order Status: Canceled Specimen: Blood from Peripheral, Hand, Left Updated: 07/25/25 0244    Rapid Organism ID by PCR (from Blood culture) [[id number]]  (Abnormal) Collected: 07/23/25 2020    Order Status: Completed Specimen: Blood from Peripheral, Forearm, Right Updated: 07/24/25 2141     Enterococcus faecalis Not Detected     Enterococcus faecium Not Detected     Listeria monocytogenes Not Detected     Staphylococcus spp. Not Detected     Staphylococcus aureus Not Detected     Staphylococcus epidermidis Not Detected     Staphylococcus lugdunensis Not Detected     Streptococcus spp. Not Detected     Streptococcus agalactiae (Group B) Not Detected     Streptococcus pneumoniae Not Detected     Streptococcus pyogenes (Group A) Not Detected     Acinetobacter calcoaceticus/baumannii complex Not Detected     Bacteroides fragilis Not Detected     Enterobacterales See Species for ID     Enterobacter cloacae complex Not Detected     Escherichia coli Detected     Klebsiella aerogenes Not Detected     Klebsiella oxytoca Not Detected     Klebsiella pneumoniae group Not Detected     Proteus spp. Not Detected     Salmonella spp. Not Detected     Serratia marcescens Not Detected     Haemophilus influenzae Not Detected     Neisseria meningitidis Not Detected     Pseudomonas aeruginosa Not Detected     Stenotrophomonas maltophilia Not Detected     Candida albicans Not Detected     Candida auris Not Detected     Candida glabrata Not Detected     Candy krusei Not  Detected     Candida parapsilosis Not Detected     Candida tropicalis Not Detected     Cryptococcus neoformans/gattii Not Detected     CTX-M (ESBL ) Not Detected     Comment: Note: Antimicrobial resistance can occur via multiple mechanisms. A Not Detected result for antimicrobial resistance gene(s) does not indicate antimicrobial susceptibility. Subculturing is required for species identification and susceptibility testing of   isolates.        IMP (Cabapenemase ) Not Detected     Comment: Note: Antimicrobial resistance can occur via multiple mechanisms. A Not Detected result for antimicrobial resistance gene(s) does not indicate antimicrobial susceptibility. Subculturing is required for species identification and susceptibility testing of   isolates.        KPC resistance gene (Carbapenemase ) Not Detected     Comment: Note: Antimicrobial resistance can occur via multiple mechanisms. A Not Detected result for antimicrobial resistance gene(s) does not indicate antimicrobial susceptibility. Subculturing is required for species identification and susceptibility testing of   isolates.        mcr-1 Not Detected     Comment: Note: Antimicrobial resistance can occur via multiple mechanisms. A Not Detected result for antimicrobial resistance gene(s) does not indicate antimicrobial susceptibility. Subculturing is required for species identification and susceptibility testing of   isolates.        mecA ID N/A     Comment: Note: Antimicrobial resistance can occur via multiple mechanisms. A Not Detected result for antimicrobial resistance gene(s) does not indicate antimicrobial susceptibility. Subculturing is required for species identification and susceptibility testing of   isolates.        mecA/C and MREJ (MRSA) gene N/A     Comment: Note: Antimicrobial resistance can occur via multiple mechanisms. A Not Detected result for antimicrobial resistance gene(s) does not indicate antimicrobial  susceptibility. Subculturing is required for species identification and susceptibility testing of   isolates.        NDM (Carbapenemase ) Not Detected     Comment: Note: Antimicrobial resistance can occur via multiple mechanisms. A Not Detected result for antimicrobial resistance gene(s) does not indicate antimicrobial susceptibility. Subculturing is required for species identification and susceptibility testing of   isolates.        OXA-48-like (Carbapenemase ) Not Detected     Comment: Note: Antimicrobial resistance can occur via multiple mechanisms. A Not Detected result for antimicrobial resistance gene(s) does not indicate antimicrobial susceptibility. Subculturing is required for species identification and susceptibility testing of   isolates.        Esther/B (VRE gene) N/A     Comment: Note: Antimicrobial resistance can occur via multiple mechanisms. A Not Detected result for antimicrobial resistance gene(s) does not indicate antimicrobial susceptibility. Subculturing is required for species identification and susceptibility testing of   isolates.        VIM (Carbapenemase ) Not Detected     Comment: Note: Antimicrobial resistance can occur via multiple mechanisms. A Not Detected result for antimicrobial resistance gene(s) does not indicate antimicrobial susceptibility. Subculturing is required for species identification and susceptibility testing of   isolates.       MRSA Screen by PCR [[id number]]     Order Status: Sent Specimen: Nasal Swab           Antibiotics (From admission, onward)      Start     Stop Route Frequency Ordered    07/26/25 1115  cefTRIAXone injection 1 g         -- IV Every 24 hours (non-standard times) 07/26/25 1001    07/25/25 1230  mupirocin 2 % ointment         07/30/25 0859 Nasl 2 times daily 07/25/25 1128             EKG  Results for orders placed or performed during the hospital encounter of 11/01/23   EKG 12-lead    Collection Time: 11/01/23  6:00 PM     Narrative    Test Reason : N39.0,    Vent. Rate : 093 BPM     Atrial Rate : 093 BPM     P-R Int : 124 ms          QRS Dur : 068 ms      QT Int : 336 ms       P-R-T Axes : 069 -04 052 degrees     QTc Int : 417 ms    Normal sinus rhythm  Normal ECG  When compared with ECG of 29-JAN-2021 16:47,  ST no longer elevated in Inferior leads  T wave inversion no longer evident in Inferior leads  T wave amplitude has increased in Anterior leads  Confirmed by Benja HERNANDEZ, Kal HIGH ([id number]) on 11/2/2023 6:00:05 PM    Referred By: [email or web address]   SELF           Confirmed By:Kal Yousif MD        I have personally reviewed the above EKG    Radiology  CT Renal Stone Study Abd Pelvis WO   Final Result      Moderate urinary bladder distension with severe right, moderate left hydronephrosis.  Findings may be due to bladder outlet obstruction.      Large volume of stool in the rectum, correlate for constipation/fecal impaction.      Several urinary bladder calculi.         Electronically signed by: Donta Arora   Date:    07/24/2025   Time:    00:24      X-Ray Chest 1 View   Final Result      No acute abnormality.         Electronically signed by: Donta Arora   Date:    07/23/2025   Time:    21:26           I have personally reviewed the above imaging      Assessment/Plan:     Catherine Wilkinson is a 79 y.o. male with intellectual disability, chronic right-sided hydronephrosis, hypertension who presented to [facility] on 7/23/2025 for fever/decreased po intake/lethargy [id number].  In the ED, patient tachycardic to low 100s with soft blood pressures and fever with T-max of 103.1°.  Physical exam largely unrevealing - baseline per caregiver. Labs remarkable for stage I acute kidney injury with non anion gap metabolic acidosis, lactic acid 3.1.  Urinalysis notable for greater than 100 WBCs, many bacteria, leuk esterase, 2+ protein, 3+ blood. Patient is admitted to [facility] for sepsis secondary to E coli bacteremia from UTI source. Pending  susceptibilities     Sepsis with Acute Organ Dysfunction  E. Coli Bacteremia  Complicated Urinary Tract Infection in setting of L acute hydronephrosis, R chronic hydronephrosis  Acute Metabolic Encephalopathy   - Source: Urine  - UA: >100WBCs, many bacteria, leuk esterase, 3+ blood   - End Organ dysfunction: CASIE, encephalopathy  - SIRS 3/4, qSOFA 2/3, MEWS 4, Gestalt +  - Continue volume resuscitation   - LA 3.1 > 1  - CT AP w/o chronic R hydronephrosis but atrophic. L sided nephrosis as well with bladder calculi  - Urology consulted, appreciate recs. Plan to keep chinchilla in on discharge and follow up outpatient with repeat imaging, will consider voiding trial at that time   - Bcx positive x2 for E. Coli; sensitive to cipro/levaquin/CTX  - Ucx with >100,000 GNR   - discontinue vanc/zosyn -> de-escalate to ceftriaxone  - repeat BC ordered on 7/25 with NGTD  - will switch to oral abx on discharge      Stage 1 Acute Kidney Injury on Chronic Kidney Disease Stage 2  Chronic R Sided Hydronephrosis   Acute L sided hydronephrosis  Bladder calculi   - Baseline Cr 1.0, GFR >60 now 1.9, 36 respectively   - Suspect 2/2 distributive volume loss iso sepsis on background of known R sided hydronephrosis   - Continue volume resuscitation   - CT renal stone showed moderate urinary bladder distension with severe rt, mod left hydronephrosis  - Strict I/Os  - Renally dose medication  - Urology consulted, plan to keep chinchilla in on discharge and follow up outpatient with repeat imaging, will consider voiding trial at that time      Non-Anion Gap Metabolic Acidosis   Anion Gap Metabolic Acidosis   - AG 8, HCO3- 18, LA 3.1  - Mixed gap/non-gap  - No diarrhea   - Trend LA, continue IVF  - Daily BMP, if no improvement consider UAG     Normocytic Anemia   Anemia of Chronic Disease  - Hgb 9.3, MCV 95  - No recent bleeding/melena   - Not UTD with age appropriate Ca screening   - anemia work up remarkable for anemia of chronic disease      Thrombocytopenia   - 109 on admit, relatively chronic near baseline   - CTM while inpatient      Hypertension   - Holding home antihypertensives      Intellectual Disability  - Unifying diagnosis not available in chart  - Has had issues since birth per sister  - Patient with contractions of extremities and abdominal wall at baseline. CP?  - Near baseline per caregiver       Indwelling Lines:     Peripheral IV 07/23/25 2000 20 G Anterior;Right Upper Arm (Active)          VTE Ppx:  VTE Risk Mitigation (From admission, onward)           Ordered     enoxaparin injection 30 mg  Daily         07/23/25 2259     IP VTE HIGH RISK PATIENT  Once         07/23/25 2259     Place sequential compression device  Until discontinued         07/23/25 2259                  Diet: soft, bite sized  Code: Full  Dispo: Floor. Tele. Pending improved hemodynamics, renal function. Pending Bcx susceptibilities  Estimated LOS: 2-3 days     Patient seen and discussed with attending physician: Jacquelin Fisher MD. Attestation may differ from plan, please appreciate.     Kerri Poole MD   [facility] Internal Medicine PGY-1      [facility] Medicine Hospitalist Phone numbers:   [facility] Hospitalist Medicine Team A (Dolores/Phillip): [phone]  [facility] Hospitalist Medicine Team B (Leonardo/Melony):  [phone]

## 2025-07-27 NOTE — PROGRESS NOTES
[facility]      HOME HEALTH ORDERS  FACE TO FACE ENCOUNTER    Patient Name: Catherine Wilkinson  YOB: 1945    PCP: Eitan Dhaliwal MD   PCP Address: [address] [place] [address] / [place] [address]  PCP Phone Number: [phone]  PCP Fax: [phone]    Encounter Date: 7/23/25    Admit to Home Health    Diagnoses:  Active Hospital Problems    Diagnosis  POA    Bacteremia due to Escherichia coli [R78.81, B96.20]  Yes    Acute cystitis without hematuria [N30.00]  Yes    Normocytic anemia [D64.9]  Yes    Thrombocytopenia [D69.6]  Yes    Bilateral hydronephrosis [N13.30]  Yes    Hydronephrosis [N13.30]  Yes     R kidney      Intellectual disability [F79]  Yes     Chronic      Resolved Hospital Problems    Diagnosis Date Resolved POA    *Sepsis with acute organ dysfunction without septic shock [A41.9, R65.20] 07/27/2025 Yes    Bladder-neck obstruction [N32.0] 07/27/2025 Yes    Acute encephalopathy [G93.40] 07/27/2025 Yes    Metabolic acidosis [E87.20] 07/27/2025 Yes    Fecal impaction [K56.41] 07/27/2025 Yes    CASIE (acute kidney injury) [N17.9] 07/27/2025 Yes       Follow Up Appointments:  No future appointments.    Allergies:Review of patient's allergies indicates:  No Known Allergies    Medications: Review discharge medications with patient and family and provide education.    Current Medications[1]     Medication List        START taking these medications      levoFLOXacin 750 MG tablet  Commonly known as: LEVAQUIN  Take 1 tablet (750 mg total) by mouth every other day. for 10 days  Start taking on: July 29, 2025     polyethylene glycol 17 gram Pwpk  Commonly known as: GLYCOLAX  Take 17 g by mouth daily as needed for Constipation.            CHANGE how you take these medications      olmesartan 40 MG tablet  Commonly known as: BENICAR  Take 0.5 tablets (20 mg total) by mouth once daily.  What changed: how much to take            STOP taking these medications      amLODIPine 10 MG tablet  Commonly known as:  NORVASC                I have seen and examined this patient within the last 30 days. My clinical findings that support the need for the home health skilled services and home bound status are the following:no   Medical restrictions requiring assistance of another human to leave home due to  Wound care needs and chinchilla care/management.  Mental confusion making it unsafe for patient to leave home alone due to  intellectual development delay.     Diet:   other soft and bite sized    Labs:  Report Lab results to PCP.    Referrals/ Consults  Speech Therapy  to evaluate and treat for  Swallowing.  Aide to provide assistance with personal care, ADLs, and vital signs.    Activities:   activity as tolerated    Nursing:   Agency to admit patient within 24 hours of hospital discharge unless specified on physician order or at patient request    SN to complete comprehensive assessment including routine vital signs. Instruct on disease process and s/s of complications to report to MD. Review/verify medication list sent home with the patient at time of discharge  and instruct patient/caregiver as needed. Frequency may be adjusted depending on start of care date.     Skilled nurse to perform up to 3 visits PRN for symptoms related to diagnosis    Notify MD if SBP > 160 or < 90; DBP > 90 or < 50; HR > 120 or < 50; Temp > 101; O2 < 88%; Other:       Ok to schedule additional visits based on staff availability and patient request on consecutive days within the home health episode.    When multiple disciplines ordered:    Start of Care occurs on Sunday - Wednesday schedule remaining discipline evaluations as ordered on separate consecutive days following the start of care.    Thursday SOC -schedule subsequent evaluations Friday and Monday the following week.     Friday - Saturday SOC - schedule subsequent discipline evaluations on consecutive days starting Monday of the following week.    For all post-discharge communication and  subsequent orders please contact patient's primary care physician. If unable to reach primary care physician or do not receive response within 30 minutes, please contact Leighton Lee MD for clinical staff order clarification    Miscellaneous   Pandya Care: Instruct patient/caregiver to empty Pandya bag.  Change Pandya every month.    Home Health Aide:  Nursing Twice weekly and Home Health Aide Twice weekly    Wound Care Orders  no    I certify that this patient is confined to his home and needs intermittent skilled nursing care and speech therapy.        Leighton Lee MD  [facility] Internal Medicine HO-II             [1]   Current Facility-Administered Medications   Medication Dose Route Frequency Provider Last Rate Last Admin    dextrose 50% injection 12.5 g  12.5 g Intravenous PRN Mega Young MD        dextrose 50% injection 25 g  25 g Intravenous PRN Mega Young MD        enoxaparin injection 30 mg  30 mg Subcutaneous Daily Mega Young MD   30 mg at 07/26/25 1812    glucagon (human recombinant) injection 1 mg  1 mg Intramuscular PRN Mega Young MD        glucose chewable tablet 16 g  16 g Oral PRN Mega Young MD        glucose chewable tablet 24 g  24 g Oral PRN Mega Young MD        [START ON 7/29/2025] levoFLOXacin tablet 750 mg  750 mg Oral Every other day Leighton Lee MD        mupirocin 2 % ointment   Nasal BID Jacquelin Fisher MD   Given at 07/27/25 0839    naloxone 0.4 mg/mL injection 0.02 mg  0.02 mg Intravenous PRN Mega Young MD        polyethylene glycol packet 17 g  17 g Oral Daily Mega Young MD   17 g at 07/27/25 0839    senna tablet 8.6 mg  8.6 mg Oral Daily Leighton Lee MD   8.6 mg at 07/27/25 0839    sodium chloride 0.9% flush 10 mL  10 mL Intravenous Q12H PRN Mega Young MD

## 2025-07-27 NOTE — PLAN OF CARE
Problem: Hospitalized Older Adult  Goal: Optimal Cognitive Function  Outcome: Progressing  Goal: Effective Bowel Elimination  Outcome: Progressing  Goal: Optimal Coping  Outcome: Progressing  Goal: Fluid and Electrolyte Balance  Outcome: Progressing  Goal: Optimal Functional Ability  Outcome: Progressing  Goal: Improved Oral Intake  Outcome: Progressing  Goal: Adequate Sleep/Rest  Outcome: Progressing  Goal: Effective Urinary Elimination  Outcome: Progressing     Problem: Adult Inpatient Plan of Care  Goal: Plan of Care Review  Outcome: Progressing  Goal: Patient-Specific Goal (Individualized)  Outcome: Progressing  Goal: Absence of Hospital-Acquired Illness or Injury  Outcome: Progressing  Goal: Optimal Comfort and Wellbeing  Outcome: Progressing  Goal: Readiness for Transition of Care  Outcome: Progressing

## 2025-07-29 ENCOUNTER — TELEPHONE (OUTPATIENT)
Dept: UROLOGY | Facility: CLINIC | Age: 80
End: 2025-07-29
Payer: MEDICARE

## 2025-07-29 ENCOUNTER — PATIENT OUTREACH (OUTPATIENT)
Dept: ADMINISTRATIVE | Facility: CLINIC | Age: 80
End: 2025-07-29
Payer: MEDICARE

## 2025-07-29 NOTE — TELEPHONE ENCOUNTER
Copied from CRM #[id number]. Topic: Appointments - Appointment Rescheduling  >> Jul 29, 2025 10:21 AM Yenny wrote:  Type:  Needs Medical Advice    Who Called: Florinda  Symptoms (please be specific): Kidney stones, recent hosp stay f/u visit   Would the patient rather a call back or a response via [facility]? Call  Best Call Back Number: [phone]  Additional Information: Stated patient ws referred to see Dr Hinds    Called patient, assisted and scheduled appointment

## 2025-07-29 NOTE — TELEPHONE ENCOUNTER
Copied from CRM #[id number]. Topic: Appointments - Appointment Rescheduling  >> Jul 29, 2025 10:21 AM Yenny wrote:  Type:  Needs Medical Advice    Who Called: Florinda  Symptoms (please be specific): Kidney stones, recent hosp stay f/u visit   Would the patient rather a call back or a response via [facility]? Call  Best Call Back Number: [phone]  Additional Information: Stated patient ws referred to see Dr Hinds

## 2025-07-29 NOTE — PROGRESS NOTES
Spoke with patient Catherine Wilkinson ( sister) . [facility]

## 2025-07-30 ENCOUNTER — HOSPITAL ENCOUNTER (OUTPATIENT)
Dept: RADIOLOGY | Facility: HOSPITAL | Age: 80
Discharge: HOME | End: 2025-07-30
Payer: MEDICARE

## 2025-07-30 ENCOUNTER — OFFICE VISIT (OUTPATIENT)
Dept: UROLOGY | Facility: CLINIC | Age: 80
End: 2025-07-30
Payer: MEDICARE

## 2025-07-30 VITALS — DIASTOLIC BLOOD PRESSURE: 58 MMHG | SYSTOLIC BLOOD PRESSURE: 103 MMHG | HEART RATE: 66 BPM

## 2025-07-30 DIAGNOSIS — N13.30 HYDRONEPHROSIS, UNSPECIFIED HYDRONEPHROSIS TYPE: Primary | ICD-10-CM

## 2025-07-30 DIAGNOSIS — N13.30 HYDRONEPHROSIS, UNSPECIFIED HYDRONEPHROSIS TYPE: ICD-10-CM

## 2025-07-30 LAB — BACTERIA BLD CULT: NORMAL

## 2025-07-30 PROCEDURE — 3074F SYST BP LT 130 MM HG: CPT | Mod: CPTII,S$GLB,,

## 2025-07-30 PROCEDURE — 76770 US EXAM ABDO BACK WALL COMP: CPT | Mod: TC

## 2025-07-30 PROCEDURE — 1101F PT FALLS ASSESS-DOCD LE1/YR: CPT | Mod: CPTII,S$GLB,,

## 2025-07-30 PROCEDURE — 76770 US EXAM ABDO BACK WALL COMP: CPT | Mod: 26,,, | Performed by: RADIOLOGY

## 2025-07-30 PROCEDURE — 99214 OFFICE O/P EST MOD 30 MIN: CPT | Mod: S$GLB,,,

## 2025-07-30 PROCEDURE — 1126F AMNT PAIN NOTED NONE PRSNT: CPT | Mod: CPTII,S$GLB,,

## 2025-07-30 PROCEDURE — 99999 PR PBB SHADOW E&M-EST. PATIENT-LVL III: CPT | Mod: PBBFAC,,,

## 2025-07-30 PROCEDURE — 3078F DIAST BP <80 MM HG: CPT | Mod: CPTII,S$GLB,,

## 2025-07-30 PROCEDURE — 1111F DSCHRG MED/CURRENT MED MERGE: CPT | Mod: CPTII,S$GLB,,

## 2025-07-30 PROCEDURE — 1159F MED LIST DOCD IN RCRD: CPT | Mod: CPTII,S$GLB,,

## 2025-07-30 PROCEDURE — 3288F FALL RISK ASSESSMENT DOCD: CPT | Mod: CPTII,S$GLB,,
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
## 2025-07-31 ENCOUNTER — OFFICE VISIT (OUTPATIENT)
Dept: HOME HEALTH SERVICES | Facility: CLINIC | Age: 80
End: 2025-07-31
Payer: MEDICARE

## 2025-07-31 VITALS — OXYGEN SATURATION: 97 % | HEART RATE: 56 BPM | DIASTOLIC BLOOD PRESSURE: 60 MMHG | SYSTOLIC BLOOD PRESSURE: 122 MMHG

## 2025-07-31 DIAGNOSIS — N13.30 BILATERAL HYDRONEPHROSIS: Primary | ICD-10-CM

## 2025-07-31 DIAGNOSIS — F79 INTELLECTUAL DISABILITY: Chronic | ICD-10-CM

## 2025-07-31 DIAGNOSIS — I10 HYPERTENSION, UNSPECIFIED TYPE: ICD-10-CM

## 2025-07-31 DIAGNOSIS — N13.30 HYDRONEPHROSIS, UNSPECIFIED HYDRONEPHROSIS TYPE: Primary | ICD-10-CM

## 2025-07-31 PROCEDURE — 1160F RVW MEDS BY RX/DR IN RCRD: CPT | Mod: CPTII,S$GLB,,

## 2025-07-31 PROCEDURE — 3078F DIAST BP <80 MM HG: CPT | Mod: CPTII,S$GLB,,

## 2025-07-31 PROCEDURE — 1111F DSCHRG MED/CURRENT MED MERGE: CPT | Mod: CPTII,S$GLB,,

## 2025-07-31 PROCEDURE — 99495 TRANSJ CARE MGMT MOD F2F 14D: CPT | Mod: S$GLB,,,

## 2025-07-31 PROCEDURE — 1159F MED LIST DOCD IN RCRD: CPT | Mod: CPTII,S$GLB,,

## 2025-07-31 PROCEDURE — 3074F SYST BP LT 130 MM HG: CPT | Mod: CPTII,S$GLB,,

## 2025-07-31 NOTE — PROGRESS NOTES
[facility]  Transitional Care Management (TCM) Home Visit    Encounter Provider: BAKARI BURGESS,   PCP: Eitan Dhaliwal MD  Consult Requested By: No ref. provider found  Admit Date: 7/23/25   IP Discharge Date: 7/27/25  Hospital Length of Stay: 4 days  Days since discharge (from IP or SNF): 4 days  [facility] On Call Contact Note: 7/29  Hospital Diagnosis: No admission diagnoses are documented for this encounter.     HISTORY OF PRESENT ILLNESS      Patient ID: Catherine Wilkinson is a 79 y.o. male was recently admitted to the hospital, this is their TCM encounter.    Hospital Course Synopsis:  Complicated UTI with bilateral hydronephrosis and subsequent sepsis    Developments since hospitalization and current needs:   Doing well since discharge. Chinchilla remains in place. Draining light yellow urine with minimal sediment in tube. Followed up with urology yesterday 7/30 with repeat imaging- improvement in hydronephrosis but not yet resolved. Plan to leave chinchilla in until follow up 1 month.     Two sisters present at visit today. They are well versed in his care and diagnosis. Receiving [facility]  services.     VSS. Reviewed medications and signs and symptoms to report if needed.     Current ADL functionality -   lives belén sister  Mobility- wheelchair bound  Appetite is adequate- at baseline  Elimination - normal -LBM today    Sleep- adequate- at baseline  Medication Management per sister.   Medication needs at this time - none  Transportation to and from appointments via sister/family       DECISION MAKING TODAY       Assessment & Plan: SEE HPI    1. Bilateral hydronephrosis  Assessment & Plan:  Improvement in hydronephrosis degree.   Maintain chinchilla catheter until urology follow up as scheduled.   Reviewed worrisome signs and symptoms to report.   Take antibiotics to completion.  Family verbalizes understanding.     US RETROPERITONEAL COMPLETE 7/30/2025  Impression:     Echogenic atrophic right kidney with elevated  resistive index compatible with intrinsic renal disease noting decrease in size and elevation of resistive index of the right kidney over time as compared to the prior ultrasound 11/02/2023.     There is persistent right hydronephrosis though it appears decreased in degree as compared to recent CT 7/23 noting interval bladder catheter placement.     Left kidney demonstrates hydronephrosis though it also appears decreased as compared to the prior CT 7/23.     Collapsed bladder limiting characterization with Pandya catheter balloon in place within the bladder.  There is echogenic cellular debris within the small amount of urine in the bladder.  Correlate for infectious or hemorrhagic cellular debris.     4 mm echogenic focus in the left kidney is nonspecific and may represent vascular calcification or nonobstructing calculus not well characterized on recent CT.      2. Intellectual disability  Assessment & Plan:  At baseline functional and mental status      3. Hypertension, unspecified type  Assessment & Plan:  Chronic and stable.  Continue olmesartan at current dose.   Recommend low sodium diet. Avoid frozen dinners, canned goods, if possible.  Follow up with PCP.      Hypertension Medications              olmesartan (BENICAR) 40 MG tablet Take 0.5 tablets (20 mg total) by mouth once daily.                        ENVIRONMENT OF CARE      Family and/or Caregiver present at visit?  Yes  Name of Caregiver: Sister Florinda  History provided by: caregiver    Advance Care Planning   Advanced Care Planning Status:  Patient has had an ACP conversation  Living Will: No  Power of : No  [place]: No    Does Caregiver have HCPoA: Yes  Changes today: none       Impression upon entering the home:  Physical Dwelling: single family home   Appearance of home environment: cleanliness: clean, walking pathways: clear, lighting: adequate, and home structure: sound structure  Functional Status: max assistance  Mobility: chair  bound  Nutritional access: adequate intake and access  Home Health: Yes,  Agency [facility]   DME/Supplies: wheelchair and Pandya catheter supplies     Diagnostic tests reviewed/disposition: No diagnosic tests pending after this hospitalization.  Disease/illness education: UTI hydronephrosis  Establishment or re-establishment of referral orders for community resources: No other necessary community resources.   Discussion with other health care providers: No discussion with other health care providers necessary.   Does patient have a PCP at [facility]? Yes   Repatriation plan with PCP? follow-up with PCP within 30d   Does patient have an ostomy (ileostomy, colostomy, suprapubic catheter, nephrostomy tube, tracheostomy, PEG tube, pleurex catheter, cholecystostomy, etc)? No  Were BPAs reviewed? Yes    Social History     Socioeconomic History    Marital status: Single   Tobacco Use    Smoking status: Never   Vaping Use    Vaping status: Never Used   Substance and Sexual Activity    Alcohol use: No    Drug use: Never    Sexual activity: Never     Social Drivers of Health     Financial Resource Strain: Patient Unable To Answer (7/25/2025)    Overall Financial Resource Strain (CARDIA)     Difficulty of Paying Living Expenses: Patient unable to answer   Food Insecurity: Patient Unable To Answer (7/25/2025)    Hunger Vital Sign     Worried About Running Out of Food in the Last Year: Patient unable to answer     Ran Out of Food in the Last Year: Patient unable to answer   Recent Concern: Food Insecurity - High Risk (6/11/2025)    Received from [facility] SDOH Screening     In the past year have you or any family members you live with been unable to get any of the following when it was really needed?  check all that apply: Foodutilities   Transportation Needs: Patient Unable To Answer (7/25/2025)    PRAPARE - Transportation     Lack of Transportation (Medical): Patient unable to answer     Lack of Transportation  (Non-Medical): Patient unable to answer   Physical Activity: Patient Unable To Answer (7/25/2025)    Exercise Vital Sign     Days of Exercise per Week: Patient unable to answer     Minutes of Exercise per Session: Patient unable to answer   Stress: Patient Unable To Answer (7/25/2025)    [employer] [place] [employer] - Occupational Stress Questionnaire     Feeling of Stress : Patient unable to answer   Housing Stability: Patient Unable To Answer (7/25/2025)    Housing Stability Vital Sign     Unable to Pay for Housing in the Last Year: Patient unable to answer     Homeless in the Last Year: Patient unable to answer         OBJECTIVE:     Vital Signs:  Vital Signs  Pulse: (!) 56  SpO2: 97 %  BP: 122/60    Review of Systems   Constitutional:  Negative for activity change and appetite change.   Respiratory:  Negative for shortness of breath.    Cardiovascular:  Negative for chest pain.   Gastrointestinal:  Negative for constipation and diarrhea.   Genitourinary:  Negative for decreased urine volume and hematuria.   Neurological:  Negative for numbness.       Physical Exam  Constitutional:       Appearance: Normal appearance.   Cardiovascular:      Rate and Rhythm: Normal rate and regular rhythm.   Pulmonary:      Effort: Pulmonary effort is normal.      Breath sounds: Normal breath sounds.   Abdominal:      General: Abdomen is flat. Bowel sounds are normal.      Palpations: Abdomen is soft.   Genitourinary:     Comments: Pandya catheter present. Draining light yellow urine with minimal sediment in dependant tube  Skin:     General: Skin is warm and dry.   Neurological:      Mental Status: He is alert. Mental status is at baseline.      Comments: Nonverbal at baseline.              INSTRUCTIONS FOR PATIENT:   Medication List on Discharge:     Medication List            Accurate as of July 31, 2025 11:59 PM. If you have any questions, ask your nurse or doctor.                CONTINUE taking these medications       levoFLOXacin 750 MG tablet  Commonly known as: LEVAQUIN  Take 1 tablet (750 mg total) by mouth every other day. for 10 days     olmesartan 40 MG tablet  Commonly known as: BENICAR  Take 0.5 tablets (20 mg total) by mouth once daily.     polyethylene glycol 17 gram Pwpk  Commonly known as: GLYCOLAX  Take 17 g by mouth daily as needed for Constipation.              Medication Reconciliation:  Were medications changed on discharge? Yes  Were medications in the home? Yes  Is the patient taking the medications as directed? Yes  Does the patient/caregiver understand the medications and changes? Yes  Does updated med list accurately reflects meds patient is currently taking? Yes      Scheduled Follow-up, Appts Reviewed with Modifications if Needed: Yes  Future Appointments   Date Time Provider Department Center   8/25/2025  8:30 AM [facility] CT2 LIMIT 450 LBS [facility] CT SCAN [facility]   8/26/2025  8:00 AM Shabana Mckeon FNP [facility] UROLOGY [facility]   9/3/2025  1:00 PM Shabana Mckeon FNP [facility] UROLOGY [facility]         Signature:      BAKARI BURGESS,, NP    Transition of Care Visit:  I have reviewed and updated the history and problem list.  I have reconciled the medication list.  I have discussed the hospitalization and current medical issues, prognosis and plans with the patient/family.

## 2025-08-02 PROBLEM — I10 HYPERTENSION: Status: ACTIVE | Noted: 2025-08-02

## 2025-08-02 NOTE — ASSESSMENT & PLAN NOTE
Chronic and stable.  Continue olmesartan at current dose.   Recommend low sodium diet. Avoid frozen dinners, canned goods, if possible.  Follow up with PCP.      Hypertension Medications              olmesartan (BENICAR) 40 MG tablet Take 0.5 tablets (20 mg total) by mouth once daily.

## 2025-08-02 NOTE — ASSESSMENT & PLAN NOTE
Improvement in hydronephrosis degree.   Maintain chinchilla catheter until urology follow up as scheduled.   Reviewed worrisome signs and symptoms to report.   Take antibiotics to completion.  Family verbalizes understanding.     US RETROPERITONEAL COMPLETE 7/30/2025  Impression:     Echogenic atrophic right kidney with elevated resistive index compatible with intrinsic renal disease noting decrease in size and elevation of resistive index of the right kidney over time as compared to the prior ultrasound 11/02/2023.     There is persistent right hydronephrosis though it appears decreased in degree as compared to recent CT 7/23 noting interval bladder catheter placement.     Left kidney demonstrates hydronephrosis though it also appears decreased as compared to the prior CT 7/23.     Collapsed bladder limiting characterization with Chinchilla catheter balloon in place within the bladder.  There is echogenic cellular debris within the small amount of urine in the bladder.  Correlate for infectious or hemorrhagic cellular debris.     4 mm echogenic focus in the left kidney is nonspecific and may represent vascular calcification or nonobstructing calculus not well characterized on recent CT.

## 2025-08-25 ENCOUNTER — HOSPITAL ENCOUNTER (OUTPATIENT)
Dept: RADIOLOGY | Facility: HOSPITAL | Age: 80
Discharge: HOME | End: 2025-08-25
Payer: MEDICARE

## 2025-08-25 DIAGNOSIS — N13.30 HYDRONEPHROSIS, UNSPECIFIED HYDRONEPHROSIS TYPE: ICD-10-CM

## 2025-08-25 PROCEDURE — 74176 CT ABD & PELVIS W/O CONTRAST: CPT | Mod: 26,,, | Performed by: RADIOLOGY

## 2025-08-25 PROCEDURE — 74176 CT ABD & PELVIS W/O CONTRAST: CPT | Mod: TC

## 2025-08-27 ENCOUNTER — OFFICE VISIT (OUTPATIENT)
Dept: UROLOGY | Facility: CLINIC | Age: 80
End: 2025-08-27
Payer: MEDICARE

## 2025-08-27 VITALS
BODY MASS INDEX: 16.25 KG/M2 | DIASTOLIC BLOOD PRESSURE: 93 MMHG | HEIGHT: 62 IN | HEART RATE: 52 BPM | SYSTOLIC BLOOD PRESSURE: 169 MMHG

## 2025-08-27 DIAGNOSIS — N21.0 BLADDER STONES: ICD-10-CM

## 2025-08-27 DIAGNOSIS — R33.9 URINARY RETENTION: Primary | ICD-10-CM

## 2025-08-27 DIAGNOSIS — N13.30 HYDRONEPHROSIS, UNSPECIFIED HYDRONEPHROSIS TYPE: ICD-10-CM

## 2025-08-27 PROCEDURE — 3080F DIAST BP >= 90 MM HG: CPT | Mod: CPTII,S$GLB,,

## 2025-08-27 PROCEDURE — 99999 PR PBB SHADOW E&M-EST. PATIENT-LVL III: CPT | Mod: PBBFAC,,,

## 2025-08-27 PROCEDURE — 99214 OFFICE O/P EST MOD 30 MIN: CPT | Mod: S$GLB,,,

## 2025-08-27 PROCEDURE — 1159F MED LIST DOCD IN RCRD: CPT | Mod: CPTII,S$GLB,,

## 2025-08-27 PROCEDURE — 3288F FALL RISK ASSESSMENT DOCD: CPT | Mod: CPTII,S$GLB,,

## 2025-08-27 PROCEDURE — 1126F AMNT PAIN NOTED NONE PRSNT: CPT | Mod: CPTII,S$GLB,,

## 2025-08-27 PROCEDURE — 1101F PT FALLS ASSESS-DOCD LE1/YR: CPT | Mod: CPTII,S$GLB,,

## 2025-08-27 PROCEDURE — 3077F SYST BP >= 140 MM HG: CPT | Mod: CPTII,S$GLB,,
